# Patient Record
Sex: FEMALE | Race: WHITE | NOT HISPANIC OR LATINO | URBAN - METROPOLITAN AREA
[De-identification: names, ages, dates, MRNs, and addresses within clinical notes are randomized per-mention and may not be internally consistent; named-entity substitution may affect disease eponyms.]

---

## 2017-05-09 ENCOUNTER — INPATIENT (INPATIENT)
Facility: HOSPITAL | Age: 27
LOS: 1 days | Discharge: HOME | DRG: 742 | End: 2017-05-11
Attending: OBSTETRICS & GYNECOLOGY | Admitting: OBSTETRICS & GYNECOLOGY
Payer: COMMERCIAL

## 2017-05-09 VITALS
DIASTOLIC BLOOD PRESSURE: 83 MMHG | TEMPERATURE: 98 F | OXYGEN SATURATION: 98 % | HEIGHT: 67 IN | RESPIRATION RATE: 18 BRPM | SYSTOLIC BLOOD PRESSURE: 116 MMHG | WEIGHT: 130.07 LBS | HEART RATE: 101 BPM

## 2017-05-09 DIAGNOSIS — Z98.89 OTHER SPECIFIED POSTPROCEDURAL STATES: Chronic | ICD-10-CM

## 2017-05-09 LAB
ALBUMIN SERPL ELPH-MCNC: 4.6 G/DL — SIGNIFICANT CHANGE UP (ref 3.4–5)
ALP SERPL-CCNC: 74 U/L — SIGNIFICANT CHANGE UP (ref 40–120)
ALT FLD-CCNC: 18 U/L — SIGNIFICANT CHANGE UP (ref 12–42)
ANION GAP SERPL CALC-SCNC: 5 MMOL/L — LOW (ref 9–16)
APPEARANCE UR: CLEAR — SIGNIFICANT CHANGE UP
AST SERPL-CCNC: 18 U/L — SIGNIFICANT CHANGE UP (ref 15–37)
BASOPHILS NFR BLD AUTO: 0.4 % — SIGNIFICANT CHANGE UP (ref 0–2)
BILIRUB SERPL-MCNC: 0.5 MG/DL — SIGNIFICANT CHANGE UP (ref 0.2–1.2)
BILIRUB UR-MCNC: NEGATIVE — SIGNIFICANT CHANGE UP
BUN SERPL-MCNC: 8 MG/DL — SIGNIFICANT CHANGE UP (ref 7–23)
CALCIUM SERPL-MCNC: 8.8 MG/DL — SIGNIFICANT CHANGE UP (ref 8.5–10.5)
CHLORIDE SERPL-SCNC: 104 MMOL/L — SIGNIFICANT CHANGE UP (ref 96–108)
CO2 SERPL-SCNC: 27 MMOL/L — SIGNIFICANT CHANGE UP (ref 22–31)
COLOR SPEC: YELLOW — SIGNIFICANT CHANGE UP
CREAT SERPL-MCNC: 0.76 MG/DL — SIGNIFICANT CHANGE UP (ref 0.5–1.3)
DIFF PNL FLD: NEGATIVE — SIGNIFICANT CHANGE UP
EOSINOPHIL NFR BLD AUTO: 8.3 % — HIGH (ref 0–6)
GLUCOSE SERPL-MCNC: 91 MG/DL — SIGNIFICANT CHANGE UP (ref 70–99)
GLUCOSE UR QL: NEGATIVE — SIGNIFICANT CHANGE UP
HCG SERPL-ACNC: <1 MIU/ML — SIGNIFICANT CHANGE UP
HCG UR QL: NEGATIVE — SIGNIFICANT CHANGE UP
HCT VFR BLD CALC: 41 % — SIGNIFICANT CHANGE UP (ref 34.5–45)
HGB BLD-MCNC: 14.4 G/DL — SIGNIFICANT CHANGE UP (ref 11.5–15.5)
KETONES UR-MCNC: NEGATIVE — SIGNIFICANT CHANGE UP
LEUKOCYTE ESTERASE UR-ACNC: NEGATIVE — SIGNIFICANT CHANGE UP
LYMPHOCYTES # BLD AUTO: 18.6 % — SIGNIFICANT CHANGE UP (ref 13–44)
MCHC RBC-ENTMCNC: 31.9 PG — SIGNIFICANT CHANGE UP (ref 27–34)
MCHC RBC-ENTMCNC: 35.1 G/DL — SIGNIFICANT CHANGE UP (ref 32–36)
MCV RBC AUTO: 90.7 FL — SIGNIFICANT CHANGE UP (ref 80–100)
MONOCYTES NFR BLD AUTO: 5.9 % — SIGNIFICANT CHANGE UP (ref 2–14)
NEUTROPHILS NFR BLD AUTO: 66.8 % — SIGNIFICANT CHANGE UP (ref 43–77)
NITRITE UR-MCNC: NEGATIVE — SIGNIFICANT CHANGE UP
PH UR: 6.5 — SIGNIFICANT CHANGE UP (ref 5–8)
PLATELET # BLD AUTO: 247 K/UL — SIGNIFICANT CHANGE UP (ref 150–400)
POTASSIUM SERPL-MCNC: 3.9 MMOL/L — SIGNIFICANT CHANGE UP (ref 3.5–5.3)
POTASSIUM SERPL-SCNC: 3.9 MMOL/L — SIGNIFICANT CHANGE UP (ref 3.5–5.3)
PROT SERPL-MCNC: 8.2 G/DL — SIGNIFICANT CHANGE UP (ref 6.4–8.2)
PROT UR-MCNC: NEGATIVE MG/DL — SIGNIFICANT CHANGE UP
RBC # BLD: 4.52 M/UL — SIGNIFICANT CHANGE UP (ref 3.8–5.2)
RBC # FLD: 13.3 % — SIGNIFICANT CHANGE UP (ref 10.3–16.9)
SODIUM SERPL-SCNC: 136 MMOL/L — SIGNIFICANT CHANGE UP (ref 135–145)
SP GR SPEC: 1.01 — SIGNIFICANT CHANGE UP (ref 1–1.03)
UROBILINOGEN FLD QL: 0.2 E.U./DL — SIGNIFICANT CHANGE UP
WBC # BLD: 10.3 K/UL — SIGNIFICANT CHANGE UP (ref 3.8–10.5)
WBC # FLD AUTO: 10.3 K/UL — SIGNIFICANT CHANGE UP (ref 3.8–10.5)

## 2017-05-09 PROCEDURE — 74177 CT ABD & PELVIS W/CONTRAST: CPT | Mod: 26

## 2017-05-09 PROCEDURE — 76856 US EXAM PELVIC COMPLETE: CPT | Mod: 26

## 2017-05-09 PROCEDURE — 99285 EMERGENCY DEPT VISIT HI MDM: CPT

## 2017-05-09 PROCEDURE — 76830 TRANSVAGINAL US NON-OB: CPT | Mod: 26

## 2017-05-09 RX ORDER — SODIUM CHLORIDE 9 MG/ML
1000 INJECTION INTRAMUSCULAR; INTRAVENOUS; SUBCUTANEOUS
Qty: 0 | Refills: 0 | Status: DISCONTINUED | OUTPATIENT
Start: 2017-05-09 | End: 2017-05-10

## 2017-05-09 RX ORDER — MORPHINE SULFATE 50 MG/1
4 CAPSULE, EXTENDED RELEASE ORAL ONCE
Qty: 0 | Refills: 0 | Status: DISCONTINUED | OUTPATIENT
Start: 2017-05-09 | End: 2017-05-09

## 2017-05-09 RX ORDER — HYDROMORPHONE HYDROCHLORIDE 2 MG/ML
0.5 INJECTION INTRAMUSCULAR; INTRAVENOUS; SUBCUTANEOUS
Qty: 0 | Refills: 0 | Status: DISCONTINUED | OUTPATIENT
Start: 2017-05-09 | End: 2017-05-10

## 2017-05-09 RX ORDER — ACETAMINOPHEN 500 MG
975 TABLET ORAL EVERY 8 HOURS
Qty: 0 | Refills: 0 | Status: COMPLETED | OUTPATIENT
Start: 2017-05-09 | End: 2017-05-09

## 2017-05-09 RX ORDER — IOHEXOL 300 MG/ML
50 INJECTION, SOLUTION INTRAVENOUS ONCE
Qty: 0 | Refills: 0 | Status: COMPLETED | OUTPATIENT
Start: 2017-05-09 | End: 2017-05-09

## 2017-05-09 RX ORDER — DRONABINOL 2.5 MG
0 CAPSULE ORAL
Qty: 0 | Refills: 0 | COMMUNITY

## 2017-05-09 RX ORDER — HYDROMORPHONE HYDROCHLORIDE 2 MG/ML
2 INJECTION INTRAMUSCULAR; INTRAVENOUS; SUBCUTANEOUS EVERY 4 HOURS
Qty: 0 | Refills: 0 | Status: DISCONTINUED | OUTPATIENT
Start: 2017-05-09 | End: 2017-05-10

## 2017-05-09 RX ORDER — SODIUM CHLORIDE 9 MG/ML
1000 INJECTION INTRAMUSCULAR; INTRAVENOUS; SUBCUTANEOUS ONCE
Qty: 0 | Refills: 0 | Status: COMPLETED | OUTPATIENT
Start: 2017-05-09 | End: 2017-05-09

## 2017-05-09 RX ADMIN — SODIUM CHLORIDE 125 MILLILITER(S): 9 INJECTION INTRAMUSCULAR; INTRAVENOUS; SUBCUTANEOUS at 15:26

## 2017-05-09 RX ADMIN — MORPHINE SULFATE 4 MILLIGRAM(S): 50 CAPSULE, EXTENDED RELEASE ORAL at 13:35

## 2017-05-09 RX ADMIN — SODIUM CHLORIDE 1000 MILLILITER(S): 9 INJECTION INTRAMUSCULAR; INTRAVENOUS; SUBCUTANEOUS at 13:23

## 2017-05-09 RX ADMIN — MORPHINE SULFATE 4 MILLIGRAM(S): 50 CAPSULE, EXTENDED RELEASE ORAL at 14:00

## 2017-05-09 RX ADMIN — Medication 975 MILLIGRAM(S): at 19:50

## 2017-05-09 RX ADMIN — HYDROMORPHONE HYDROCHLORIDE 0.5 MILLIGRAM(S): 2 INJECTION INTRAMUSCULAR; INTRAVENOUS; SUBCUTANEOUS at 19:01

## 2017-05-09 RX ADMIN — HYDROMORPHONE HYDROCHLORIDE 2 MILLIGRAM(S): 2 INJECTION INTRAMUSCULAR; INTRAVENOUS; SUBCUTANEOUS at 22:43

## 2017-05-09 RX ADMIN — HYDROMORPHONE HYDROCHLORIDE 0.5 MILLIGRAM(S): 2 INJECTION INTRAMUSCULAR; INTRAVENOUS; SUBCUTANEOUS at 19:16

## 2017-05-09 RX ADMIN — IOHEXOL 50 MILLILITER(S): 300 INJECTION, SOLUTION INTRAVENOUS at 15:22

## 2017-05-09 RX ADMIN — Medication 25 MILLIGRAM(S): at 21:43

## 2017-05-09 RX ADMIN — Medication 975 MILLIGRAM(S): at 19:01

## 2017-05-09 RX ADMIN — HYDROMORPHONE HYDROCHLORIDE 2 MILLIGRAM(S): 2 INJECTION INTRAMUSCULAR; INTRAVENOUS; SUBCUTANEOUS at 21:43

## 2017-05-09 NOTE — ED PROVIDER NOTE - MEDICAL DECISION MAKING DETAILS
25 yo female with  hx of endometriosis- LLQ pain x 4 days  mild tenderness HCG neg  seen by GYN ? ruptured cyst  lower susp for torsion- await imaging will admit  requestuing CT as well

## 2017-05-09 NOTE — ED PROVIDER NOTE - OBJECTIVE STATEMENT
25 yo female with hx of endometriosis- with multiple prior surgeries with LLQ pain x 4 days  no fevers or chills no diarrhea  pos flatus  and BM  no recent sexual IC - LMP 4-19-17  no prior hx of torsion ? ruptured cyst in past - no dysuria or flank pain or chills

## 2017-05-09 NOTE — CONSULT NOTE ADULT - SUBJECTIVE AND OBJECTIVE BOX
Pain Management Consult Note - [facility] [phone]    Chief Complaint:  abdominal pain    HPI:25yo LMP 4/20/17 presents to the ED with worsening lower abdominal, suprapubic pain for the past 1 week. Pt is s/p endometriosis resection with Dr. Fraire on 8/5/2016. Pt states she had endometriosis resection in pelvic sidewall region, sigmoid region with Dr. Estes, general surgery, and ureterolysis. Pt states prior to the surgery she was bedbound due to endometriosis pain. Pt states post surgery she felt "better than before" in the first 5 months up until february of this year. She states she has been having intermittent lower abdominal pain that fluctuated from cramping to pulling to sharp pain. She states the pain is worse with movement and better when lying still.  Pt. states she has been on a butrans patch, tramadol and gabapentin in the past.  States she most recently has been using medical marijuana that she gets from an outside source as she has had constipation in the past from opiates.  States she has also tried hypogastric nerve blocks in the past.      Pain is _x__ sharp ____dull ___burning ___achy ___ Intensity: ____ mild ___mod ___severe     Location ____surgical site ____cervical _____lumbar __x__abd ____upper ext____lower ext    Worse with ___x_activity __x__movement _____physical therapy___ Rest    Improved with __x__medication __x__rest ____physical therapy    ROS: Const:  ___febrile   Eyes:___ENT:___CV: ___chest pain  Resp: ____sob  GI:___nausea ___vomiting _+__abd pain ___npo ___clears __full diet __bm  :___ Musk: ___pain ___spasm  Skin:___ Neuro:  _--__sedation__-_confusion___ numbness ___weakness ___paresth  Psych:__anxiety  Endo:___ Heme:___Allergy:_NKDA________, ___all others reviewed and negative    PAST MEDICAL & SURGICAL HISTORY:  Hypovitaminosis D  Endometriosis  Status post surgery: endometriosis surgery: 10/2014, 05/2015, 10/2015   appendectomy during the first surgery 10/2014  S/P bunionectomy: right 2012      SH: denies___Tobacco   ___Alcohol                          FH:FAMILY HISTORY:  No pertinent family history in first degree relatives      sodium chloride 0.9%. 1000milliLiter(s) IV Continuous <Continuous>      T(C): 36.9, Max: 36.9 (05-09 @ 12:55)  HR: 101 (101 - 101)  BP: 116/83 (116/83 - 116/83)  RR: 18 (18 - 18)  SpO2: 98% (98% - 98%)  Wt(kg): --    T(C): 36.9, Max: 36.9 (05-09 @ 12:55)  HR: 101 (101 - 101)  BP: 116/83 (116/83 - 116/83)  RR: 18 (18 - 18)  SpO2: 98% (98% - 98%)  Wt(kg): --    T(C): 36.9, Max: 36.9 (05-09 @ 12:55)  HR: 101 (101 - 101)  BP: 116/83 (116/83 - 116/83)  RR: 18 (18 - 18)  SpO2: 98% (98% - 98%)  Wt(kg): --    PHYSICAL EXAM:  Gen Appearance: _x__no acute distress __x_appropriate        Neuro: ___SILT feet__x__ EOM Intact Psych: AAOX_3_, ___mood/affect appropriate        Eyes: _x__conjunctiva WNL  ___x__ Pupils equal and round        ENT: _x__ears and nose atraumatic_x__ Hearing grossly intact        Neck: ___trachea midline, no visible masses ___thyroid without palpable mass    Resp: x___Nml WOB____No tactile fremitus ___clear to auscultation    Cardio: ___extremities free from edema ____pedal pulses palpable    GI/Abdomen: ___soft _____ Nontender______Nondistended_____HSM    Lymphatic: ___no palpable nodes in neck  ___no palpable nodes calves and feet    Skin/Wound: ___Incision, ___Dressing c/d/i,   ____surrounding tissues soft,  ___drain/chest tube present____    Muscular: EHL ___/5  Gastrocnemius___/5    _x__absent clubbing/cyanosis      ASSESSMENT: This is a 26y old Female with a history of   ENDOMETRIOSIS: ENDOMETRIOSIS  No h/o HF  No pertinent family history in first degree relatives  Hypovitaminosis D  Endometriosis  Status post surgery: endometriosis surgery: 10/2014, 05/2015, 10/2015   appendectomy during the first surgery 10/2014  S/P bunionectomy: right 2012  and worsening abdominal pain being admitted for additional work up.      Recommended Treatment PLAN:  1.  Consider valium 5 mg po q8h prn  2.  Consider  dilaudid 2-4 mg po q4h prn  3.  Consider dilaudid 0.5 mg IV q2h prn  4.  Consider lyrica 25 mg po TID  5.  Consider tylenol 975 mg po q8h standing

## 2017-05-09 NOTE — ED PROVIDER NOTE - PROGRESS NOTE DETAILS
GYN req U/S and CT--  will admit pending imaging  vaginal cx sent - pelvic min CMT - slight left adnexal tenderness-- no sig dc per GYN

## 2017-05-09 NOTE — H&P ADULT - HISTORY OF PRESENT ILLNESS
27yo LMP 17 presents to the ED with worsening lower abdominal, suprapubic pain for the past 1 week. Pt is s/p endometriosis resection with Dr. Fraire on 2016. Pt states she had endometriosis resection in pelvic sidewall region, sigmoid region with Dr. Estes, general surgery, and ureterolysis. Pt states prior to the surgery she was bedbound due to endometriosis pain. Pt states post surgery she felt "better than before" in the first 5 months up until february of this year. She states she has been having intermittent lower abdominal pain that fluctuated from cramping to pulling to sharp pain. She states the pain is worse with movement and better when lying still. She states she has been taking marijuana for the pain which she gets from an outside source, not from pain management. Pt states in the past she used to get nerve blocks for endometrial pain. Pt states she has a mirena IUD in and has had it since  .She states since then she has had her menstrual cycle monthly with very minimal bleeding almost spotting lasting 2-3 days.  Pt denies fever, chills, chest pain, SOB, abdominal pain, nausea, vomiting, vaginal bleeding    OB/GYN Hx: G0  PMHx: Endometriosis  SHx: Endometriosis excision , ,    Meds: Liyah  Allergies:     PHYSICAL EXAM:   Vital Signs Last 24 Hrs  T(C): 36.9, Max: 36.9 (05-09 @ 12:55)  T(F): 98.4, Max: 98.4 (05-09 @ 12:55)  HR: 101 (101 - 101)  BP: 116/83 (116/83 - 116/83)  BP(mean): --  RR: 18 (18 - 18)  SpO2: 98% (98% - 98%)    **************************  Constitutional: No acute distress  Respiratory: Clear to ausculation bilaterally  Cardiovascular: regular rate and rhythm  Gastrointestinal: soft, tenderness to deep palpation lower abdominal region, +rebound, no guarding, positive bowel sounds  Pelvic exam: normal external genitalia, cervix closed, milky white vaginal discharge, cervix closed, +CMT, + b/l adnexal tenderness   Extremities: no calf tenderness or swelling    LABS:                        14.4   10.3  )-----------( 247      ( 09 May 2017 13:35 )             41.0         136  |  104  |  8   ----------------------------<  91  3.9   |  27  |  0.76    Ca    8.8      09 May 2017 13:35    TPro  8.2  /  Alb  4.6  /  TBili  0.5  /  DBili  x   /  AST  18  /  ALT  18  /  AlkPhos  74        Urinalysis Basic - ( 09 May 2017 13:35 )    Color: Yellow / Appearance: Clear / S.010 / pH: x  Gluc: x / Ketone: NEGATIVE  / Bili: NEGATIVE / Urobili: 0.2 E.U./dL   Blood: x / Protein: NEGATIVE mg/dL / Nitrite: NEGATIVE   Leuk Esterase: NEGATIVE / RBC: x / WBC x   Sq Epi: x / Non Sq Epi: x / Bacteria: x    HCG Quantitative, Serum: <1 mIU/mL ( @ 13:35)    RADIOLOGY & ADDITIONAL STUDIES: 27yo LMP 17 presents to the ED with worsening lower abdominal, suprapubic pain for the past 1 week Left>Right. Pt is s/p endometriosis resection with Dr. Fraire on 2016. Pt states she had endometriosis resection in pelvic sidewall region, sigmoid region with Dr. Estes, general surgery, and ureterolysis. Pt states prior to the surgery she was bedbound due to endometriosis pain. Pt states post surgery she felt "better than before" in the first 5 months up until february of this year. She states she has been having intermittent lower abdominal pain that fluctuated from cramping to pulling to sharp pain. She states the pain is worse with movement and better when lying still. She states she has been taking marijuana for the pain which she gets from an outside source, not from pain management. Pt states in the past she used to get nerve blocks for endometrial pain. Pt states she has a mirena IUD in and has had it since  .She states since then she has had her menstrual cycle monthly with very minimal bleeding almost spotting lasting 2-3 days. Pt states painful bowel movements and last bowel movement yesterday- loose stools. Pt states associated nausea.    Pt denies fever, chills, chest pain, SOB, vomiting, vaginal bleeding, or lightheadedness.     OB/GYN Hx: Endometriosis, Mirena IUD since    PMHx: Endometriosis  SHx: Laparoscopic endometriosis excision , . Laparoscopic endometriosis excision with appendectomy in [facility] in 2016 with Dr. Fraire   Meds: Pt states she take Marijuana for pain  NKDA     PHYSICAL EXAM:   Vital Signs Last 24 Hrs  T(C): 36.9, Max: 36.9 (05-09 @ 12:55)  T(F): 98.4, Max: 98.4 (05-09 @ 12:55)  HR: 101 (101 - 101)  BP: 116/83 (116/83 - 116/83)  BP(mean): --  RR: 18 (18 - 18)  SpO2: 98% (98% - 98%)    **************************  Constitutional: No acute distress  Respiratory: Clear to ausculation bilaterally  Cardiovascular: regular rate and rhythm  Gastrointestinal: soft, tenderness to deep palpation lower abdominal region, +rebound, no guarding, positive bowel sounds  Pelvic exam: normal external genitalia, cervix closed, milky white vaginal discharge, cervix closed, +CMT, + b/l adnexal tenderness   Extremities: no calf tenderness or swelling    LABS:                        14.4   10.3  )-----------( 247      ( 09 May 2017 13:35 )             41.0         136  |  104  |  8   ----------------------------<  91  3.9   |  27  |  0.76    Ca    8.8      09 May 2017 13:35    TPro  8.2  /  Alb  4.6  /  TBili  0.5  /  DBili  x   /  AST  18  /  ALT  18  /  AlkPhos  74        Urinalysis Basic - ( 09 May 2017 13:35 )    Color: Yellow / Appearance: Clear / S.010 / pH: x  Gluc: x / Ketone: NEGATIVE  / Bili: NEGATIVE / Urobili: 0.2 E.U./dL   Blood: x / Protein: NEGATIVE mg/dL / Nitrite: NEGATIVE   Leuk Esterase: NEGATIVE / RBC: x / WBC x   Sq Epi: x / Non Sq Epi: x / Bacteria: x    HCG Quantitative, Serum: <1 mIU/mL ( @ 13:35)    RADIOLOGY & ADDITIONAL STUDIES:    A/P: 27yo LMP 17 presents to the ED with worsening lower abdominal, suprapubic pain for the past 1 week left>right. Pt's heart rate is 100. Hemoglobin stable 14.4, WBC 10.3. Follow up transvaginal ultrasound, CT of abdomen and pelvis with contrast, and follow up pain management consult.

## 2017-05-09 NOTE — ED PROVIDER NOTE - CARE PLAN
Principal Discharge DX:	Endometriosis Principal Discharge DX:	Endometriosis  Secondary Diagnosis:	Abdominal pain

## 2017-05-10 ENCOUNTER — RESULT REVIEW (OUTPATIENT)
Age: 27
End: 2017-05-10

## 2017-05-10 DIAGNOSIS — N80.9 ENDOMETRIOSIS, UNSPECIFIED: ICD-10-CM

## 2017-05-10 LAB
APTT BLD: 32.2 SEC — SIGNIFICANT CHANGE UP (ref 27.5–37.4)
BLD GP AB SCN SERPL QL: NEGATIVE — SIGNIFICANT CHANGE UP
CULTURE RESULTS: NO GROWTH — SIGNIFICANT CHANGE UP
INR BLD: 1.13 — SIGNIFICANT CHANGE UP (ref 0.88–1.16)
PROTHROM AB SERPL-ACNC: 12.6 SEC — SIGNIFICANT CHANGE UP (ref 9.8–12.7)
RH IG SCN BLD-IMP: NEGATIVE — SIGNIFICANT CHANGE UP
SPECIMEN SOURCE: SIGNIFICANT CHANGE UP

## 2017-05-10 RX ORDER — METOCLOPRAMIDE HCL 10 MG
10 TABLET ORAL ONCE
Qty: 0 | Refills: 0 | Status: DISCONTINUED | OUTPATIENT
Start: 2017-05-10 | End: 2017-05-11

## 2017-05-10 RX ORDER — ONDANSETRON 8 MG/1
8 TABLET, FILM COATED ORAL EVERY 6 HOURS
Qty: 0 | Refills: 0 | Status: DISCONTINUED | OUTPATIENT
Start: 2017-05-10 | End: 2017-05-11

## 2017-05-10 RX ORDER — ONDANSETRON 8 MG/1
4 TABLET, FILM COATED ORAL ONCE
Qty: 0 | Refills: 0 | Status: COMPLETED | OUTPATIENT
Start: 2017-05-10 | End: 2017-05-10

## 2017-05-10 RX ORDER — ACETAMINOPHEN 500 MG
1000 TABLET ORAL EVERY 6 HOURS
Qty: 0 | Refills: 0 | Status: DISCONTINUED | OUTPATIENT
Start: 2017-05-11 | End: 2017-05-11

## 2017-05-10 RX ORDER — METOCLOPRAMIDE HCL 10 MG
10 TABLET ORAL ONCE
Qty: 0 | Refills: 0 | Status: COMPLETED | OUTPATIENT
Start: 2017-05-10 | End: 2017-05-10

## 2017-05-10 RX ORDER — SODIUM CHLORIDE 9 MG/ML
1000 INJECTION, SOLUTION INTRAVENOUS
Qty: 0 | Refills: 0 | Status: DISCONTINUED | OUTPATIENT
Start: 2017-05-10 | End: 2017-05-11

## 2017-05-10 RX ORDER — HYDROMORPHONE HYDROCHLORIDE 2 MG/ML
0.5 INJECTION INTRAMUSCULAR; INTRAVENOUS; SUBCUTANEOUS ONCE
Qty: 0 | Refills: 0 | Status: DISCONTINUED | OUTPATIENT
Start: 2017-05-10 | End: 2017-05-10

## 2017-05-10 RX ORDER — ONDANSETRON 8 MG/1
4 TABLET, FILM COATED ORAL EVERY 6 HOURS
Qty: 0 | Refills: 0 | Status: DISCONTINUED | OUTPATIENT
Start: 2017-05-10 | End: 2017-05-11

## 2017-05-10 RX ORDER — KETOROLAC TROMETHAMINE 30 MG/ML
30 SYRINGE (ML) INJECTION EVERY 6 HOURS
Qty: 0 | Refills: 0 | Status: DISCONTINUED | OUTPATIENT
Start: 2017-05-11 | End: 2017-05-11

## 2017-05-10 RX ORDER — SIMETHICONE 80 MG/1
80 TABLET, CHEWABLE ORAL EVERY 6 HOURS
Qty: 0 | Refills: 0 | Status: DISCONTINUED | OUTPATIENT
Start: 2017-05-10 | End: 2017-05-11

## 2017-05-10 RX ORDER — HYDROMORPHONE HYDROCHLORIDE 2 MG/ML
30 INJECTION INTRAMUSCULAR; INTRAVENOUS; SUBCUTANEOUS
Qty: 0 | Refills: 0 | Status: DISCONTINUED | OUTPATIENT
Start: 2017-05-10 | End: 2017-05-11

## 2017-05-10 RX ORDER — NALOXONE HYDROCHLORIDE 4 MG/.1ML
0.1 SPRAY NASAL
Qty: 0 | Refills: 0 | Status: DISCONTINUED | OUTPATIENT
Start: 2017-05-10 | End: 2017-05-11

## 2017-05-10 RX ADMIN — Medication 25 MILLIGRAM(S): at 05:32

## 2017-05-10 RX ADMIN — HYDROMORPHONE HYDROCHLORIDE 0.5 MILLIGRAM(S): 2 INJECTION INTRAMUSCULAR; INTRAVENOUS; SUBCUTANEOUS at 09:09

## 2017-05-10 RX ADMIN — HYDROMORPHONE HYDROCHLORIDE 2 MILLIGRAM(S): 2 INJECTION INTRAMUSCULAR; INTRAVENOUS; SUBCUTANEOUS at 06:33

## 2017-05-10 RX ADMIN — Medication 10 MILLIGRAM(S): at 14:17

## 2017-05-10 RX ADMIN — HYDROMORPHONE HYDROCHLORIDE 0.5 MILLIGRAM(S): 2 INJECTION INTRAMUSCULAR; INTRAVENOUS; SUBCUTANEOUS at 14:22

## 2017-05-10 RX ADMIN — HYDROMORPHONE HYDROCHLORIDE 2 MILLIGRAM(S): 2 INJECTION INTRAMUSCULAR; INTRAVENOUS; SUBCUTANEOUS at 11:26

## 2017-05-10 RX ADMIN — HYDROMORPHONE HYDROCHLORIDE 0.5 MILLIGRAM(S): 2 INJECTION INTRAMUSCULAR; INTRAVENOUS; SUBCUTANEOUS at 14:36

## 2017-05-10 RX ADMIN — HYDROMORPHONE HYDROCHLORIDE 0.5 MILLIGRAM(S): 2 INJECTION INTRAMUSCULAR; INTRAVENOUS; SUBCUTANEOUS at 20:38

## 2017-05-10 RX ADMIN — HYDROMORPHONE HYDROCHLORIDE 30 MILLILITER(S): 2 INJECTION INTRAMUSCULAR; INTRAVENOUS; SUBCUTANEOUS at 20:35

## 2017-05-10 RX ADMIN — HYDROMORPHONE HYDROCHLORIDE 0.5 MILLIGRAM(S): 2 INJECTION INTRAMUSCULAR; INTRAVENOUS; SUBCUTANEOUS at 12:21

## 2017-05-10 RX ADMIN — SIMETHICONE 80 MILLIGRAM(S): 80 TABLET, CHEWABLE ORAL at 22:00

## 2017-05-10 RX ADMIN — ONDANSETRON 4 MILLIGRAM(S): 8 TABLET, FILM COATED ORAL at 07:26

## 2017-05-10 RX ADMIN — HYDROMORPHONE HYDROCHLORIDE 0.5 MILLIGRAM(S): 2 INJECTION INTRAMUSCULAR; INTRAVENOUS; SUBCUTANEOUS at 19:55

## 2017-05-10 RX ADMIN — HYDROMORPHONE HYDROCHLORIDE 0.5 MILLIGRAM(S): 2 INJECTION INTRAMUSCULAR; INTRAVENOUS; SUBCUTANEOUS at 08:54

## 2017-05-10 RX ADMIN — HYDROMORPHONE HYDROCHLORIDE 2 MILLIGRAM(S): 2 INJECTION INTRAMUSCULAR; INTRAVENOUS; SUBCUTANEOUS at 10:26

## 2017-05-10 RX ADMIN — ONDANSETRON 4 MILLIGRAM(S): 8 TABLET, FILM COATED ORAL at 15:43

## 2017-05-10 RX ADMIN — HYDROMORPHONE HYDROCHLORIDE 2 MILLIGRAM(S): 2 INJECTION INTRAMUSCULAR; INTRAVENOUS; SUBCUTANEOUS at 05:32

## 2017-05-10 NOTE — PROGRESS NOTE ADULT - SUBJECTIVE AND OBJECTIVE BOX
Pt evaluated at bedside. She reports pain improved overnight w/ regimen as ordered by pain management team.  She denies HA, dizziness, SOB, CP, palpitations, n/v. She is able to ambulate to bathroom and void without any issues.    T(C): 36.7, Max: 36.9 (05-10 @ 05:13)  HR: 75 (64 - 75)  BP: 110/72 (99/53 - 110/72)  RR: 16 (16 - 16)  SpO2: 98% (98% - 99%)  Wt(kg): --  GA: NAD, A+OX3  CV: RRR, no MRG  Pulm: CTAB  Abd: +BS, soft, diffusely tender on palpation w/ mild guarding, no rebound tenderness  Extrem: no calf tenderness  I & Os for 24h ending 05-10 @ 07:00  =============================================  IN: 1375 ml / OUT: 850 ml / NET: 525 ml    I & Os for current day (as of 05-10 @ 07:26)  =============================================  IN: 0 ml / OUT: 200 ml / NET: -200 ml                        14.4   10.3  )-----------( 247      ( 09 May 2017 13:35 )             41.0     05-09    136  |  104  |  8   ----------------------------<  91  3.9   |  27  |  0.76    Ca    8.8      09 May 2017 13:35    TPro  8.2  /  Alb  4.6  /  TBili  0.5  /  DBili  x   /  AST  18  /  ALT  18  /  AlkPhos  74  05-09      MEDICATIONS  (STANDING):  sodium chloride 0.9%. 1000milliLiter(s) IV Continuous <Continuous>  pregabalin 25milliGRAM(s) Oral three times a day  metoclopramide Injectable 10milliGRAM(s) IV Push once    MEDICATIONS  (PRN):  diazepam    Tablet 5milliGRAM(s) Oral every 8 hours PRN anxiety  HYDROmorphone   Tablet 2milliGRAM(s) Oral every 4 hours PRN Moderate Pain (4 - 6)  HYDROmorphone  Injectable 0.5milliGRAM(s) IV Push every 2 hours PRN Severe Pain (7 - 10)

## 2017-05-10 NOTE — PROGRESS NOTE ADULT - SUBJECTIVE AND OBJECTIVE BOX
1100  Pain Management Progress Note - [facility] [phone]    HPI:  Pt. complains of abdominal pain and pelvic pain that is improved with current pain regimen.  States the pain is over the bladder and pubis.  Denies side effects from pain medications.            Pertinent PMH: Pain at: ___Back ___Neck___Knee ___Hip ___Shoulder ___ Opioid tolerance    Pain is _x__ sharp ____dull ___burning ___achy ___ Intensity: ____ mild ____mod ____severe     Location _____surgical site _____cervical _____lumbar __x__abd _____upper ext____lower ext    Worse with __x__activity ___x_movement _____physical therapy___ Rest    Improved with _x___medication ___x_rest ____physical therapy    sodium chloride 0.9%.  HYDROmorphone   Tablet  HYDROmorphone  Injectable  metoclopramide Injectable      ROS: Const:  ___febrile   Eyes:___ENT:___CV: ___chest pain  Resp: ____sob  GI:_-__nausea __-_vomiting __+__abd pain _+__npo ___clears ___full diet __bm  :___ Musk: ___pain ___spasm  Skin:___ Neuro:  _-__xvgiccmk__-_cayfjkhxa____ numbness ___weakness ___paresthesia  Psych:___anxiety  Endo:___ Heme:___Allergy:___      05-09 @ 13:49284 mL/min/1.73M2          Hemoglobin: 14.4 g/dL (05-09 @ 13:35)        T(C): 36.7, Max: 36.9 (05-09 @ 18:40)  HR: 88 (64 - 88)  BP: 102/59 (99/53 - 112/62)  RR: 16 (16 - 18)  SpO2: 97% (97% - 100%)  Wt(kg): --     PHYSICAL EXAM:  Gen Appearance: _x__no acute distress _x__appropriate         Neuro: _x__SILT feet__x__ EOM Intact Psych: AAOX_3_, _x__mood/affect appropriate        Eyes: _x__conjunctiva WNL  ___x__ Pupils equal and round        ENT: _x__ears and nose atraumatic__x_ Hearing grossly intact        Neck: ___trachea midline, no visible masses ___thyroid without palpable mass    Resp: __x_Nml WOB____No tactile fremitus ___clear to auscultation    Cardio: ___extremities free from edema ____pedal pulses palpable    GI/Abdomen: _x__soft __x___ Nontender to very light palpation____Nondistended_____HSM      Lymphatic: ___no palpable nodes in neck  ___no palpable nodes calves and feet    Skin/Wound: _x__Incision (4 1 cm healed incisions on abdomen that are nontender to the touch), ___Dressing c/d/i,   ____surrounding tissues soft,  ___drain/chest tube present____    Muscular: EHL _5__/5  Gastrocnemius___/5    _x__absent clubbing/cyanosis         ASSESSMENT:  This is a 26y old Female with a history of:  ENDOMETRIOSIS  No h/o HF  No pertinent family history in first degree relatives  Hypovitaminosis D  Endometriosis  Status post surgery  S/P bunionectomy  and abdominal pain that is improved today.  Awaiting results of CT scan.        Recommended Treatment PLAN:  1.  Dilaudid 2-4 mg po q4h prn  2.  Dilaudid 0.5 mg IV q2h prn  3.  Valium 5 mg po q8h prn  4.  Lyrica 25 mg po TID  5.  Tylenol 975 mg po q8h

## 2017-05-10 NOTE — BRIEF OPERATIVE NOTE - OPERATION/FINDINGS
Adhesions in pelvis mostly in cul de sac. Thick adhesions on rectum to posterior uterus and on the right side of the pelvis - taken down using sharp dissection. Visible endometriosis lesions resected bilaterally. 6cm right ovarian simple cyst drained. Normal bilateral tubes. Normal left ovary. Normal appearing uterus. Endometriosis lesion visualized on the right diaphragm. Hysteroscopy revealed IUD in place and an arcuate uterus.

## 2017-05-10 NOTE — PROGRESS NOTE ADULT - PROBLEM SELECTOR PLAN 1
- pt added on for laparoscopic excision of endometriosis today  - NPO  - plan for routine postoperative care

## 2017-05-11 VITALS
HEART RATE: 78 BPM | RESPIRATION RATE: 14 BRPM | DIASTOLIC BLOOD PRESSURE: 71 MMHG | TEMPERATURE: 97 F | OXYGEN SATURATION: 95 % | SYSTOLIC BLOOD PRESSURE: 106 MMHG

## 2017-05-11 LAB
ANION GAP SERPL CALC-SCNC: 10 MMOL/L — SIGNIFICANT CHANGE UP (ref 9–16)
BUN SERPL-MCNC: 9 MG/DL — SIGNIFICANT CHANGE UP (ref 7–23)
CALCIUM SERPL-MCNC: 8.8 MG/DL — SIGNIFICANT CHANGE UP (ref 8.5–10.5)
CHLORIDE SERPL-SCNC: 105 MMOL/L — SIGNIFICANT CHANGE UP (ref 96–108)
CO2 SERPL-SCNC: 23 MMOL/L — SIGNIFICANT CHANGE UP (ref 22–31)
CREAT SERPL-MCNC: 0.64 MG/DL — SIGNIFICANT CHANGE UP (ref 0.5–1.3)
CULTURE RESULTS: SIGNIFICANT CHANGE UP
GLUCOSE SERPL-MCNC: 240 MG/DL — HIGH (ref 70–99)
HCT VFR BLD CALC: 37.3 % — SIGNIFICANT CHANGE UP (ref 34.5–45)
HGB BLD-MCNC: 13.3 G/DL — SIGNIFICANT CHANGE UP (ref 11.5–15.5)
MCHC RBC-ENTMCNC: 31.5 PG — SIGNIFICANT CHANGE UP (ref 27–34)
MCHC RBC-ENTMCNC: 35.7 G/DL — SIGNIFICANT CHANGE UP (ref 32–36)
MCV RBC AUTO: 88.4 FL — SIGNIFICANT CHANGE UP (ref 80–100)
PLATELET # BLD AUTO: 238 K/UL — SIGNIFICANT CHANGE UP (ref 150–400)
POTASSIUM SERPL-MCNC: 4.2 MMOL/L — SIGNIFICANT CHANGE UP (ref 3.5–5.3)
POTASSIUM SERPL-SCNC: 4.2 MMOL/L — SIGNIFICANT CHANGE UP (ref 3.5–5.3)
RBC # BLD: 4.22 M/UL — SIGNIFICANT CHANGE UP (ref 3.8–5.2)
RBC # FLD: 12.9 % — SIGNIFICANT CHANGE UP (ref 10.3–16.9)
SODIUM SERPL-SCNC: 138 MMOL/L — SIGNIFICANT CHANGE UP (ref 135–145)
SPECIMEN SOURCE: SIGNIFICANT CHANGE UP
WBC # BLD: 15.3 K/UL — HIGH (ref 3.8–10.5)
WBC # FLD AUTO: 15.3 K/UL — HIGH (ref 3.8–10.5)

## 2017-05-11 RX ORDER — HYDROMORPHONE HYDROCHLORIDE 2 MG/ML
0.5 INJECTION INTRAMUSCULAR; INTRAVENOUS; SUBCUTANEOUS
Qty: 0 | Refills: 0 | Status: DISCONTINUED | OUTPATIENT
Start: 2017-05-11 | End: 2017-05-11

## 2017-05-11 RX ORDER — IBUPROFEN 200 MG
600 TABLET ORAL EVERY 6 HOURS
Qty: 0 | Refills: 0 | Status: DISCONTINUED | OUTPATIENT
Start: 2017-05-11 | End: 2017-05-11

## 2017-05-11 RX ADMIN — Medication 600 MILLIGRAM(S): at 09:00

## 2017-05-11 RX ADMIN — Medication 600 MILLIGRAM(S): at 10:00

## 2017-05-11 RX ADMIN — Medication 30 MILLIGRAM(S): at 00:39

## 2017-05-11 RX ADMIN — SIMETHICONE 80 MILLIGRAM(S): 80 TABLET, CHEWABLE ORAL at 11:33

## 2017-05-11 RX ADMIN — SIMETHICONE 80 MILLIGRAM(S): 80 TABLET, CHEWABLE ORAL at 05:36

## 2017-05-11 RX ADMIN — Medication 30 MILLIGRAM(S): at 00:09

## 2017-05-11 RX ADMIN — Medication 30 MILLIGRAM(S): at 05:36

## 2017-05-11 NOTE — PROGRESS NOTE ADULT - ASSESSMENT
A/P: POD1 L/S excision of endometriosis and JOE  1. FEN - advance diet to regular  2. PAIN - OPM  3.  - await TOV  4. RESP -  IS  5. VTE prophylaxis - SCDs  6. LABS - am cbc  7. DISPO - likely d/c home today if stable and pain well controlled

## 2017-05-11 NOTE — PROGRESS NOTE ADULT - SUBJECTIVE AND OBJECTIVE BOX
Pt. seen at 0838  Pain Management [facility] [phone]    HPI:  Pt. states her abdomen is "sore" but is improved with pain medication.            Pertinent PMH: Pain at: ___Back ___Neck___Knee ___Hip ___Shoulder ___ Opioid tolerance  Abd    Pain is ___ sharp ____dull ___burning ___achy ___ Intensity: ____ mild ____mod ____severe "sore"    Location _x____surgical site _____cervical _____lumbar __x__abd _____upper ext____lower ext    Worse with ___x_activity __x__movement _____physical therapy___ Rest    Improved with __x__medication __x__rest ____physical therapy      naloxone Injectable  ondansetron Injectable  ondansetron Injectable  metoclopramide Injectable  simethicone  oxyCODONE  5 mG/acetaminophen 325 mG  ibuprofen  Tablet  oxyCODONE  5 mG/acetaminophen 325 mG      ROS: Const:  ___febrile   Eyes:___ENT:___CV: ___chest pain  Resp: ____sob  GI:___nausea ___vomiting __+__abd pain ___npo ___clears _+__full diet __bm  :___ Musk: ___pain ___spasm  Skin:___ Neuro:  _-__blawuymq__-_hjmbqmlys__-__ numbness ___weakness ___paresthesia  Psych:___anxiety  Endo:___ Heme:___Allergy:___          Hemoglobin: 14.4 g/dL (05-09 @ 13:35)        T(C): 36.8, Max: 37.4 (05-11 @ 01:31)  HR: 72 (67 - 99)  BP: 106/66 (91/57 - 115/65)  RR: 16 (16 - 19)  SpO2: 98% (98% - 100%)  Wt(kg): --     PHYSICAL EXAM:  Gen Appearance: x___no acute distress _x__appropriate         Neuro: _x__SILT feet__x__ EOM Intact Psych: AAOX3__, _x__mood/affect appropriate        Eyes: _x__conjunctiva WNL  ___x__ Pupils equal and round        ENT: _x__ears and nose atraumatic__x_ Hearing grossly intact        Neck: ___trachea midline, no visible masses ___thyroid without palpable mass    Resp: _x__Nml WOB____No tactile fremitus ___clear to auscultation    Cardio: ___extremities free from edema ____pedal pulses palpable    GI/Abdomen: ___soft _____ Nontender______Nondistended_____HSM    Lymphatic: ___no palpable nodes in neck  ___no palpable nodes calves and feet    Skin/Wound: ___Incision, _x__Dressing c/d/i (4 abdominal incisions with dressing dry and intact)   ____surrounding tissues soft,  ___drain/chest tube present____    Muscular: EHL __5_/5  Gastrocnemius___/5    _x__absent clubbing/cyanosis         ASSESSMENT:  This is a 26y old Female with a history of:  ENDOMETRIOSIS  Hypovitaminosis D  Pelvic pain in female  Laparoscopic exploration of abdomen  Status post surgery  S/P bunionectomy  Abdominal pain and is s/p laparoscopic exploration of abdomen and excision of endometriosis and is doing well.        Recommended Treatment PLAN:  1.  Dilaudid 0.5 mg IV q3h prn  2.  Percocet 5/325 1-2 tab q4h prn  3.  Motrin 600 mg po q6h prn

## 2017-05-11 NOTE — DISCHARGE NOTE ADULT - HOSPITAL COURSE
Patient admitted for pain control/underwent uncomplicated laparoscopy for endometriosis. All postoperative milestones met. Uneventful.

## 2017-05-11 NOTE — DISCHARGE NOTE ADULT - CARE PROVIDER_API CALL
Efrain Fraire), Obstetrics and Gynecology  [address] [place] [address]  Phone: [phone]  Fax: [phone]

## 2017-05-11 NOTE — DISCHARGE NOTE ADULT - CARE PLAN
Principal Discharge DX:	Endometriosis  Goal:	healthy recovery!  Instructions for follow-up, activity and diet:	no sex 6 weeks. no bath 6 weeks. shower is ok. Return to ER for fever >101, severe abdominal pain, excessive vaginal bleeding.

## 2017-05-11 NOTE — PROGRESS NOTE ADULT - SUBJECTIVE AND OBJECTIVE BOX
GYN PROGRESS NOTE PGY2    Patient evaluated at the bedside. No acute events. She did well overnight.  Denies CP/SOB/dizziness/nausea/vomiting/abdominal pain/calf pain.  Pain well controlled on PCA. No flatus. Tolerating regular diet.    T(C): 36.8, Max: 37.4 (05-11 @ 01:31)  HR: 67 (67 - 99)  BP: 91/57 (91/57 - 115/65)  RR: 16 (16 - 19)  SpO2: 98% (97% - 100%)    GEN: patient appears well  LUNGS: no respiratory distress  ABD: soft, NT, ND, steri strips c/d/i  EXT: no calf tenderness, SCDs        I & Os for current day (as of 05-11 @ 07:24)  =============================================  IN: 250 ml / OUT: 2850 ml / NET: -2600 ml

## 2017-05-11 NOTE — DISCHARGE NOTE ADULT - PLAN OF CARE
healthy recovery! no sex 6 weeks. no bath 6 weeks. shower is ok. Return to ER for fever >101, severe abdominal pain, excessive vaginal bleeding.

## 2017-05-12 PROCEDURE — 76830 TRANSVAGINAL US NON-OB: CPT

## 2017-05-12 PROCEDURE — 88305 TISSUE EXAM BY PATHOLOGIST: CPT

## 2017-05-12 PROCEDURE — 85730 THROMBOPLASTIN TIME PARTIAL: CPT

## 2017-05-12 PROCEDURE — 86850 RBC ANTIBODY SCREEN: CPT

## 2017-05-12 PROCEDURE — 86900 BLOOD TYPING SEROLOGIC ABO: CPT

## 2017-05-12 PROCEDURE — 96374 THER/PROPH/DIAG INJ IV PUSH: CPT | Mod: XU

## 2017-05-12 PROCEDURE — 84702 CHORIONIC GONADOTROPIN TEST: CPT

## 2017-05-12 PROCEDURE — 87086 URINE CULTURE/COLONY COUNT: CPT

## 2017-05-12 PROCEDURE — 85027 COMPLETE CBC AUTOMATED: CPT

## 2017-05-12 PROCEDURE — 85025 COMPLETE CBC W/AUTO DIFF WBC: CPT

## 2017-05-12 PROCEDURE — 85610 PROTHROMBIN TIME: CPT

## 2017-05-12 PROCEDURE — 80048 BASIC METABOLIC PNL TOTAL CA: CPT

## 2017-05-12 PROCEDURE — 81003 URINALYSIS AUTO W/O SCOPE: CPT

## 2017-05-12 PROCEDURE — 74177 CT ABD & PELVIS W/CONTRAST: CPT

## 2017-05-12 PROCEDURE — 99285 EMERGENCY DEPT VISIT HI MDM: CPT | Mod: 25

## 2017-05-12 PROCEDURE — 87070 CULTURE OTHR SPECIMN AEROBIC: CPT

## 2017-05-12 PROCEDURE — 80053 COMPREHEN METABOLIC PANEL: CPT

## 2017-05-12 PROCEDURE — 36415 COLL VENOUS BLD VENIPUNCTURE: CPT

## 2017-05-12 PROCEDURE — 86901 BLOOD TYPING SEROLOGIC RH(D): CPT

## 2017-05-12 PROCEDURE — 76856 US EXAM PELVIC COMPLETE: CPT

## 2017-05-12 PROCEDURE — 81025 URINE PREGNANCY TEST: CPT

## 2017-05-15 DIAGNOSIS — Q51.810 ARCUATE UTERUS: ICD-10-CM

## 2017-05-15 DIAGNOSIS — N80.3 ENDOMETRIOSIS OF PELVIC PERITONEUM: ICD-10-CM

## 2017-05-15 DIAGNOSIS — N83.201 UNSPECIFIED OVARIAN CYST, RIGHT SIDE: ICD-10-CM

## 2017-05-15 DIAGNOSIS — N85.8 OTHER SPECIFIED NONINFLAMMATORY DISORDERS OF UTERUS: ICD-10-CM

## 2017-05-15 DIAGNOSIS — N80.1 ENDOMETRIOSIS OF OVARY: ICD-10-CM

## 2017-05-15 DIAGNOSIS — N80.4 ENDOMETRIOSIS OF RECTOVAGINAL SEPTUM AND VAGINA: ICD-10-CM

## 2017-05-15 DIAGNOSIS — N80.5 ENDOMETRIOSIS OF INTESTINE: ICD-10-CM

## 2017-05-15 DIAGNOSIS — N83.8 OTHER NONINFLAMMATORY DISORDERS OF OVARY, FALLOPIAN TUBE AND BROAD LIGAMENT: ICD-10-CM

## 2017-05-15 DIAGNOSIS — N80.9 ENDOMETRIOSIS, UNSPECIFIED: ICD-10-CM

## 2017-05-16 ENCOUNTER — INPATIENT (INPATIENT)
Facility: HOSPITAL | Age: 27
LOS: 1 days | Discharge: HOME | DRG: 761 | End: 2017-05-18
Attending: OBSTETRICS & GYNECOLOGY | Admitting: OBSTETRICS & GYNECOLOGY
Payer: COMMERCIAL

## 2017-05-16 VITALS
OXYGEN SATURATION: 98 % | DIASTOLIC BLOOD PRESSURE: 76 MMHG | RESPIRATION RATE: 22 BRPM | SYSTOLIC BLOOD PRESSURE: 117 MMHG | TEMPERATURE: 98 F | WEIGHT: 134.04 LBS | HEART RATE: 134 BPM

## 2017-05-16 DIAGNOSIS — Z98.89 OTHER SPECIFIED POSTPROCEDURAL STATES: Chronic | ICD-10-CM

## 2017-05-16 LAB — SURGICAL PATHOLOGY STUDY: SIGNIFICANT CHANGE UP

## 2017-05-16 PROCEDURE — 99285 EMERGENCY DEPT VISIT HI MDM: CPT | Mod: 25

## 2017-05-16 RX ORDER — HYDROMORPHONE HYDROCHLORIDE 2 MG/ML
1 INJECTION INTRAMUSCULAR; INTRAVENOUS; SUBCUTANEOUS ONCE
Qty: 0 | Refills: 0 | Status: DISCONTINUED | OUTPATIENT
Start: 2017-05-16 | End: 2017-05-16

## 2017-05-16 RX ORDER — IOHEXOL 300 MG/ML
50 INJECTION, SOLUTION INTRAVENOUS ONCE
Qty: 0 | Refills: 0 | Status: COMPLETED | OUTPATIENT
Start: 2017-05-16 | End: 2017-05-16

## 2017-05-16 RX ORDER — SODIUM CHLORIDE 9 MG/ML
1000 INJECTION INTRAMUSCULAR; INTRAVENOUS; SUBCUTANEOUS ONCE
Qty: 0 | Refills: 0 | Status: COMPLETED | OUTPATIENT
Start: 2017-05-16 | End: 2017-05-16

## 2017-05-16 RX ORDER — ONDANSETRON 8 MG/1
4 TABLET, FILM COATED ORAL ONCE
Qty: 0 | Refills: 0 | Status: COMPLETED | OUTPATIENT
Start: 2017-05-16 | End: 2017-05-16

## 2017-05-16 RX ADMIN — SODIUM CHLORIDE 1000 MILLILITER(S): 9 INJECTION INTRAMUSCULAR; INTRAVENOUS; SUBCUTANEOUS at 23:37

## 2017-05-16 RX ADMIN — ONDANSETRON 4 MILLIGRAM(S): 8 TABLET, FILM COATED ORAL at 23:45

## 2017-05-16 RX ADMIN — IOHEXOL 50 MILLILITER(S): 300 INJECTION, SOLUTION INTRAVENOUS at 23:37

## 2017-05-16 RX ADMIN — HYDROMORPHONE HYDROCHLORIDE 1 MILLIGRAM(S): 2 INJECTION INTRAMUSCULAR; INTRAVENOUS; SUBCUTANEOUS at 23:36

## 2017-05-16 NOTE — ED ADULT TRIAGE NOTE - CHIEF COMPLAINT QUOTE
pt complaining of severe abd pain nausea without vomiting and vaginal bleeding today since 6pm hx of ovarian cyst removal Thursday and endometriosis

## 2017-05-17 DIAGNOSIS — K65.9 PERITONITIS, UNSPECIFIED: ICD-10-CM

## 2017-05-17 DIAGNOSIS — N80.9 ENDOMETRIOSIS, UNSPECIFIED: ICD-10-CM

## 2017-05-17 LAB
ALBUMIN SERPL ELPH-MCNC: 4.6 G/DL — SIGNIFICANT CHANGE UP (ref 3.4–5)
ALP SERPL-CCNC: 74 U/L — SIGNIFICANT CHANGE UP (ref 40–120)
ALT FLD-CCNC: 25 U/L — SIGNIFICANT CHANGE UP (ref 12–42)
ANION GAP SERPL CALC-SCNC: 11 MMOL/L — SIGNIFICANT CHANGE UP (ref 9–16)
APPEARANCE UR: CLEAR — SIGNIFICANT CHANGE UP
APTT BLD: 28.9 SEC — SIGNIFICANT CHANGE UP (ref 27.5–37.4)
AST SERPL-CCNC: 13 U/L — LOW (ref 15–37)
BACTERIA # UR AUTO: PRESENT /HPF
BASOPHILS NFR BLD AUTO: 0.9 % — SIGNIFICANT CHANGE UP (ref 0–2)
BILIRUB SERPL-MCNC: 0.3 MG/DL — SIGNIFICANT CHANGE UP (ref 0.2–1.2)
BILIRUB UR-MCNC: NEGATIVE — SIGNIFICANT CHANGE UP
BLD GP AB SCN SERPL QL: NEGATIVE — SIGNIFICANT CHANGE UP
BUN SERPL-MCNC: 9 MG/DL — SIGNIFICANT CHANGE UP (ref 7–23)
CALCIUM SERPL-MCNC: 9 MG/DL — SIGNIFICANT CHANGE UP (ref 8.5–10.5)
CHLORIDE SERPL-SCNC: 102 MMOL/L — SIGNIFICANT CHANGE UP (ref 96–108)
CO2 SERPL-SCNC: 25 MMOL/L — SIGNIFICANT CHANGE UP (ref 22–31)
COLOR SPEC: YELLOW — SIGNIFICANT CHANGE UP
CREAT SERPL-MCNC: 0.82 MG/DL — SIGNIFICANT CHANGE UP (ref 0.5–1.3)
DIFF PNL FLD: (no result)
EOSINOPHIL NFR BLD AUTO: 16.3 % — HIGH (ref 0–6)
EPI CELLS # UR: (no result) /HPF
GLUCOSE SERPL-MCNC: 127 MG/DL — HIGH (ref 70–99)
GLUCOSE UR QL: NEGATIVE — SIGNIFICANT CHANGE UP
HCG SERPL-ACNC: <1 MIU/ML — SIGNIFICANT CHANGE UP
HCT VFR BLD CALC: 32.2 % — LOW (ref 34.5–45)
HCT VFR BLD CALC: 38.8 % — SIGNIFICANT CHANGE UP (ref 34.5–45)
HGB BLD-MCNC: 11 G/DL — LOW (ref 11.5–15.5)
HGB BLD-MCNC: 14 G/DL — SIGNIFICANT CHANGE UP (ref 11.5–15.5)
INR BLD: 1.02 — SIGNIFICANT CHANGE UP (ref 0.88–1.16)
KETONES UR-MCNC: NEGATIVE — SIGNIFICANT CHANGE UP
LEUKOCYTE ESTERASE UR-ACNC: NEGATIVE — SIGNIFICANT CHANGE UP
LYMPHOCYTES # BLD AUTO: 33.3 % — SIGNIFICANT CHANGE UP (ref 13–44)
MCHC RBC-ENTMCNC: 30.6 PG — SIGNIFICANT CHANGE UP (ref 27–34)
MCHC RBC-ENTMCNC: 31.7 PG — SIGNIFICANT CHANGE UP (ref 27–34)
MCHC RBC-ENTMCNC: 34.2 G/DL — SIGNIFICANT CHANGE UP (ref 32–36)
MCHC RBC-ENTMCNC: 36.1 G/DL — HIGH (ref 32–36)
MCV RBC AUTO: 88 FL — SIGNIFICANT CHANGE UP (ref 80–100)
MCV RBC AUTO: 89.4 FL — SIGNIFICANT CHANGE UP (ref 80–100)
MONOCYTES NFR BLD AUTO: 7.6 % — SIGNIFICANT CHANGE UP (ref 2–14)
NEUTROPHILS NFR BLD AUTO: 41.9 % — LOW (ref 43–77)
NITRITE UR-MCNC: NEGATIVE — SIGNIFICANT CHANGE UP
PH UR: 8 — SIGNIFICANT CHANGE UP (ref 5–8)
PLATELET # BLD AUTO: 199 K/UL — SIGNIFICANT CHANGE UP (ref 150–400)
PLATELET # BLD AUTO: 327 K/UL — SIGNIFICANT CHANGE UP (ref 150–400)
POTASSIUM SERPL-MCNC: 4 MMOL/L — SIGNIFICANT CHANGE UP (ref 3.5–5.3)
POTASSIUM SERPL-SCNC: 4 MMOL/L — SIGNIFICANT CHANGE UP (ref 3.5–5.3)
PROT SERPL-MCNC: 8.1 G/DL — SIGNIFICANT CHANGE UP (ref 6.4–8.2)
PROT UR-MCNC: NEGATIVE MG/DL — SIGNIFICANT CHANGE UP
PROTHROM AB SERPL-ACNC: 11.3 SEC — SIGNIFICANT CHANGE UP (ref 9.8–12.7)
RBC # BLD: 3.6 M/UL — LOW (ref 3.8–5.2)
RBC # BLD: 4.41 M/UL — SIGNIFICANT CHANGE UP (ref 3.8–5.2)
RBC # FLD: 12.8 % — SIGNIFICANT CHANGE UP (ref 10.3–16.9)
RBC # FLD: 13.1 % — SIGNIFICANT CHANGE UP (ref 10.3–16.9)
RBC CASTS # UR COMP ASSIST: (no result) /HPF
RH IG SCN BLD-IMP: NEGATIVE — SIGNIFICANT CHANGE UP
SODIUM SERPL-SCNC: 138 MMOL/L — SIGNIFICANT CHANGE UP (ref 135–145)
SP GR SPEC: 1.01 — SIGNIFICANT CHANGE UP (ref 1–1.03)
UROBILINOGEN FLD QL: 0.2 E.U./DL — SIGNIFICANT CHANGE UP
WBC # BLD: 10.3 K/UL — SIGNIFICANT CHANGE UP (ref 3.8–10.5)
WBC # BLD: 6.1 K/UL — SIGNIFICANT CHANGE UP (ref 3.8–10.5)
WBC # FLD AUTO: 10.3 K/UL — SIGNIFICANT CHANGE UP (ref 3.8–10.5)
WBC # FLD AUTO: 6.1 K/UL — SIGNIFICANT CHANGE UP (ref 3.8–10.5)
WBC UR QL: < 5 /HPF — SIGNIFICANT CHANGE UP

## 2017-05-17 PROCEDURE — 74020: CPT | Mod: 26

## 2017-05-17 PROCEDURE — 74177 CT ABD & PELVIS W/CONTRAST: CPT | Mod: 26

## 2017-05-17 PROCEDURE — 76830 TRANSVAGINAL US NON-OB: CPT | Mod: 26

## 2017-05-17 RX ORDER — KETOROLAC TROMETHAMINE 30 MG/ML
30 SYRINGE (ML) INJECTION ONCE
Qty: 0 | Refills: 0 | Status: DISCONTINUED | OUTPATIENT
Start: 2017-05-17 | End: 2017-05-17

## 2017-05-17 RX ORDER — KETOROLAC TROMETHAMINE 30 MG/ML
30 SYRINGE (ML) INJECTION EVERY 6 HOURS
Qty: 0 | Refills: 0 | Status: DISCONTINUED | OUTPATIENT
Start: 2017-05-17 | End: 2017-05-18

## 2017-05-17 RX ORDER — SODIUM CHLORIDE 9 MG/ML
1000 INJECTION, SOLUTION INTRAVENOUS
Qty: 0 | Refills: 0 | Status: DISCONTINUED | OUTPATIENT
Start: 2017-05-17 | End: 2017-05-17

## 2017-05-17 RX ORDER — ONDANSETRON 8 MG/1
4 TABLET, FILM COATED ORAL EVERY 4 HOURS
Qty: 0 | Refills: 0 | Status: COMPLETED | OUTPATIENT
Start: 2017-05-17 | End: 2017-05-17

## 2017-05-17 RX ORDER — IPRATROPIUM/ALBUTEROL SULFATE 18-103MCG
3 AEROSOL WITH ADAPTER (GRAM) INHALATION ONCE
Qty: 0 | Refills: 0 | Status: COMPLETED | OUTPATIENT
Start: 2017-05-17 | End: 2017-05-17

## 2017-05-17 RX ORDER — MAGNESIUM HYDROXIDE 400 MG/1
30 TABLET, CHEWABLE ORAL ONCE
Qty: 0 | Refills: 0 | Status: COMPLETED | OUTPATIENT
Start: 2017-05-17 | End: 2017-05-17

## 2017-05-17 RX ORDER — POLYETHYLENE GLYCOL 3350 17 G/17G
17 POWDER, FOR SOLUTION ORAL ONCE
Qty: 0 | Refills: 0 | Status: COMPLETED | OUTPATIENT
Start: 2017-05-17 | End: 2017-05-17

## 2017-05-17 RX ORDER — MAGNESIUM HYDROXIDE 400 MG/1
30 TABLET, CHEWABLE ORAL DAILY
Qty: 0 | Refills: 0 | Status: DISCONTINUED | OUTPATIENT
Start: 2017-05-17 | End: 2017-05-18

## 2017-05-17 RX ORDER — HYDROMORPHONE HYDROCHLORIDE 2 MG/ML
1 INJECTION INTRAMUSCULAR; INTRAVENOUS; SUBCUTANEOUS ONCE
Qty: 0 | Refills: 0 | Status: DISCONTINUED | OUTPATIENT
Start: 2017-05-17 | End: 2017-05-17

## 2017-05-17 RX ORDER — DOCUSATE SODIUM 100 MG
100 CAPSULE ORAL
Qty: 0 | Refills: 0 | Status: DISCONTINUED | OUTPATIENT
Start: 2017-05-17 | End: 2017-05-18

## 2017-05-17 RX ORDER — POLYETHYLENE GLYCOL 3350 17 G/17G
17 POWDER, FOR SOLUTION ORAL
Qty: 0 | Refills: 0 | Status: DISCONTINUED | OUTPATIENT
Start: 2017-05-17 | End: 2017-05-18

## 2017-05-17 RX ORDER — METOCLOPRAMIDE HCL 10 MG
10 TABLET ORAL ONCE
Qty: 0 | Refills: 0 | Status: COMPLETED | OUTPATIENT
Start: 2017-05-17 | End: 2017-05-17

## 2017-05-17 RX ORDER — ONDANSETRON 8 MG/1
4 TABLET, FILM COATED ORAL ONCE
Qty: 0 | Refills: 0 | Status: COMPLETED | OUTPATIENT
Start: 2017-05-17 | End: 2017-05-17

## 2017-05-17 RX ORDER — HYDROMORPHONE HYDROCHLORIDE 2 MG/ML
2 INJECTION INTRAMUSCULAR; INTRAVENOUS; SUBCUTANEOUS
Qty: 0 | Refills: 0 | Status: DISCONTINUED | OUTPATIENT
Start: 2017-05-17 | End: 2017-05-18

## 2017-05-17 RX ORDER — OXYCODONE HYDROCHLORIDE 5 MG/1
5 TABLET ORAL ONCE
Qty: 0 | Refills: 0 | Status: DISCONTINUED | OUTPATIENT
Start: 2017-05-17 | End: 2017-05-17

## 2017-05-17 RX ORDER — HYDROMORPHONE HYDROCHLORIDE 2 MG/ML
1 INJECTION INTRAMUSCULAR; INTRAVENOUS; SUBCUTANEOUS
Qty: 0 | Refills: 0 | Status: DISCONTINUED | OUTPATIENT
Start: 2017-05-17 | End: 2017-05-18

## 2017-05-17 RX ORDER — ACETAMINOPHEN 500 MG
1000 TABLET ORAL ONCE
Qty: 0 | Refills: 0 | Status: COMPLETED | OUTPATIENT
Start: 2017-05-17 | End: 2017-05-17

## 2017-05-17 RX ORDER — METOCLOPRAMIDE HCL 10 MG
10 TABLET ORAL EVERY 4 HOURS
Qty: 0 | Refills: 0 | Status: COMPLETED | OUTPATIENT
Start: 2017-05-17 | End: 2017-05-17

## 2017-05-17 RX ADMIN — HYDROMORPHONE HYDROCHLORIDE 2 MILLIGRAM(S): 2 INJECTION INTRAMUSCULAR; INTRAVENOUS; SUBCUTANEOUS at 22:29

## 2017-05-17 RX ADMIN — Medication 30 MILLIGRAM(S): at 03:46

## 2017-05-17 RX ADMIN — Medication 100 MILLIGRAM(S): at 12:31

## 2017-05-17 RX ADMIN — POLYETHYLENE GLYCOL 3350 17 GRAM(S): 17 POWDER, FOR SOLUTION ORAL at 12:31

## 2017-05-17 RX ADMIN — MAGNESIUM HYDROXIDE 30 MILLILITER(S): 400 TABLET, CHEWABLE ORAL at 15:33

## 2017-05-17 RX ADMIN — MAGNESIUM HYDROXIDE 30 MILLILITER(S): 400 TABLET, CHEWABLE ORAL at 19:30

## 2017-05-17 RX ADMIN — Medication 30 MILLIGRAM(S): at 14:01

## 2017-05-17 RX ADMIN — HYDROMORPHONE HYDROCHLORIDE 1 MILLIGRAM(S): 2 INJECTION INTRAMUSCULAR; INTRAVENOUS; SUBCUTANEOUS at 00:20

## 2017-05-17 RX ADMIN — SODIUM CHLORIDE 125 MILLILITER(S): 9 INJECTION, SOLUTION INTRAVENOUS at 15:40

## 2017-05-17 RX ADMIN — SODIUM CHLORIDE 1000 MILLILITER(S): 9 INJECTION INTRAMUSCULAR; INTRAVENOUS; SUBCUTANEOUS at 00:43

## 2017-05-17 RX ADMIN — HYDROMORPHONE HYDROCHLORIDE 2 MILLIGRAM(S): 2 INJECTION INTRAMUSCULAR; INTRAVENOUS; SUBCUTANEOUS at 09:47

## 2017-05-17 RX ADMIN — ONDANSETRON 4 MILLIGRAM(S): 8 TABLET, FILM COATED ORAL at 03:45

## 2017-05-17 RX ADMIN — HYDROMORPHONE HYDROCHLORIDE 1 MILLIGRAM(S): 2 INJECTION INTRAMUSCULAR; INTRAVENOUS; SUBCUTANEOUS at 03:46

## 2017-05-17 RX ADMIN — SODIUM CHLORIDE 125 MILLILITER(S): 9 INJECTION, SOLUTION INTRAVENOUS at 05:00

## 2017-05-17 RX ADMIN — HYDROMORPHONE HYDROCHLORIDE 2 MILLIGRAM(S): 2 INJECTION INTRAMUSCULAR; INTRAVENOUS; SUBCUTANEOUS at 23:52

## 2017-05-17 RX ADMIN — POLYETHYLENE GLYCOL 3350 17 GRAM(S): 17 POWDER, FOR SOLUTION ORAL at 19:30

## 2017-05-17 RX ADMIN — SODIUM CHLORIDE 125 MILLILITER(S): 9 INJECTION, SOLUTION INTRAVENOUS at 08:51

## 2017-05-17 RX ADMIN — HYDROMORPHONE HYDROCHLORIDE 1 MILLIGRAM(S): 2 INJECTION INTRAMUSCULAR; INTRAVENOUS; SUBCUTANEOUS at 02:49

## 2017-05-17 RX ADMIN — Medication 10 MILLIGRAM(S): at 13:49

## 2017-05-17 RX ADMIN — ONDANSETRON 4 MILLIGRAM(S): 8 TABLET, FILM COATED ORAL at 13:14

## 2017-05-17 RX ADMIN — HYDROMORPHONE HYDROCHLORIDE 1 MILLIGRAM(S): 2 INJECTION INTRAMUSCULAR; INTRAVENOUS; SUBCUTANEOUS at 01:24

## 2017-05-17 RX ADMIN — Medication 30 MILLIGRAM(S): at 13:20

## 2017-05-17 RX ADMIN — HYDROMORPHONE HYDROCHLORIDE 2 MILLIGRAM(S): 2 INJECTION INTRAMUSCULAR; INTRAVENOUS; SUBCUTANEOUS at 08:51

## 2017-05-17 RX ADMIN — Medication 30 MILLIGRAM(S): at 18:59

## 2017-05-17 RX ADMIN — Medication 3 MILLILITER(S): at 05:50

## 2017-05-17 RX ADMIN — Medication 30 MILLIGRAM(S): at 04:51

## 2017-05-17 RX ADMIN — HYDROMORPHONE HYDROCHLORIDE 1 MILLIGRAM(S): 2 INJECTION INTRAMUSCULAR; INTRAVENOUS; SUBCUTANEOUS at 11:42

## 2017-05-17 RX ADMIN — Medication 30 MILLIGRAM(S): at 19:30

## 2017-05-17 RX ADMIN — Medication 400 MILLIGRAM(S): at 05:30

## 2017-05-17 RX ADMIN — HYDROMORPHONE HYDROCHLORIDE 1 MILLIGRAM(S): 2 INJECTION INTRAMUSCULAR; INTRAVENOUS; SUBCUTANEOUS at 01:22

## 2017-05-17 RX ADMIN — Medication 1000 MILLIGRAM(S): at 06:30

## 2017-05-17 RX ADMIN — HYDROMORPHONE HYDROCHLORIDE 1 MILLIGRAM(S): 2 INJECTION INTRAMUSCULAR; INTRAVENOUS; SUBCUTANEOUS at 12:36

## 2017-05-17 NOTE — CHART NOTE - NSCHARTNOTEFT_GEN_A_CORE
Patient seen and examined bedside in the ED, just prior to transfer to [facility].    Found with hips flexed, lying on left side. States that pain is at its best at time of exam. No nausea, vomiting. Pain is worst in suprapubic area and across LLQ.    Abd moderately tender to palpation across BLQ with some involuntary guarding. No rigidity. No rebound.    Will continue to monitor

## 2017-05-17 NOTE — H&P ADULT - PROBLEM SELECTOR PLAN 1
- admit for IV pain management  - TVUS  - clinical picture not consistent w/ torsion, pain likely secondary to endometriosis  - general surgery consulted, reccs appreciated, possible intervention for constipation noted on CT  - IVH, NPO  dw Dr. Fraire

## 2017-05-17 NOTE — H&P ADULT - HISTORY OF PRESENT ILLNESS
25 yo POD6 s/p laparoscopic excision of endometriosis presents c/o severe abdominal pain that started earlier this evening. She describes pain as crampy and sharp, 10/10, unrelieved by percocet, toradol PO and marijuana, associated w/ nausea w/o emesis. She states she had abdominal pain and small vaginal bleeding 2 days prior at the start of her first menses s/p surgery but that pain resolved. She denies HA, dizziness, CP, SOB, palpitations, fever. She states that she starts shaking when she has severe pain and pain is mildly relieved when she bends her knees. She states she has been passing gas and having bowel movements since surgery. She has an extensive history of endometriosis s/p 5x laparoscopic surgeries, 3 of which were ablation and 2 of which were with Dr. Fraire. She states she has been bedbound due to endometriosis pain in the past however states today's pain is different.

## 2017-05-17 NOTE — ED PROVIDER NOTE - OBJECTIVE STATEMENT
severe abd pain  27 yo with gradual onset Intensifying abd pain, last week with surgery for ovarian cyst, no with generalized abd pain worse at LLQ, nausea but no vomiting, constipation but no diarrhea. took percocet , and a CBD pill and smoked marijuana w no relief , pt with vaginal bleeding. pt has endometriosis and has generally extremely painful periods however usually not this intense.

## 2017-05-17 NOTE — PROGRESS NOTE ADULT - ASSESSMENT
26F s/p laparoscopic endometriosis resection  -patient having bowel function and passing flatus and having BMs  -patient can be advanced to clear liquid diet as tolerated  -serial abdominal exams

## 2017-05-17 NOTE — PROGRESS NOTE ADULT - ASSESSMENT
25 y/o POD7 after laparoscopic endometriosis excision re-admitted secondary to severe abdominal pain. CT abdomen 5/16 showed normal postoperative changes without abscess or hematoma. Small amount of free air, likely secondary to laparoscopic surgery, though perforation cannot be excluded given physical exam. CT also concerning for severe amount of stool.   1. Abdominal pain   - low suspicion for perforation given CT, normal Vital signs and no WBC. However, patient continues to have severe pain   - repeat CBC to follow WBC   - Suspect that severe constipation may be directly impacting current presentation.   Plan for milk of magnesium, to be followed by suppository. If no pain relief with passage of stool, perforation more supicious     Neuro: s/p Pain consult this AM. Continue toradol with PO dilaudid   CV: VSS.  LR @125cc/hr   Pulm:No issues   : lochia within normal limits.   DVT: SCDs and early ambulation     Appreciate Gen surg continued recommendations. We will continue to follow closely.

## 2017-05-17 NOTE — H&P ADULT - NSHPLABSRESULTS_GEN_ALL_CORE
14.0   10.3  )-----------( 327      ( 17 May 2017 00:37 )             38.8   05-17    138  |  102  |  9   ----------------------------<  127<H>  4.0   |  25  |  0.82    Ca    9.0      17 May 2017 00:37    TPro  8.1  /  Alb  4.6  /  TBili  0.3  /  DBili  x   /  AST  13<L>  /  ALT  25  /  AlkPhos  74  05-17  PT/INR - ( 17 May 2017 00:39 )   PT: 11.3 sec;   INR: 1.02          PTT - ( 17 May 2017 00:39 )  PTT:28.9 sec    EXAM:  CT ABDOMEN AND PELVIS OC IC                          *** ADDENDUM 05/17/2017  ***    Scattered locules of air adjacent to the anterior wall of the urinary   bladder and within the anterior abdomen, is likely related to the   patient's recentlaparoscopic procedure      *** END OF ADDENDUM 05/17/2017  ***    INTERPRETATION:  CT of the ABDOMEN and PELVIS with intravenous contrast   dated 5/17/2017 1:47 AM    INDICATION: Abdominal pain.    TECHNIQUE: CT of the abdomen and pelvis was performed with oral and   intravenous contrast. Axial, sagittal and coronal images were produced   and reviewed.    PRIOR STUDIES: None.    FINDINGS: Images of the lower chest demonstrate no significant   abnormality. Contrast in the distal esophagus suggestive of   gastroesophageal reflux.    The liver is normal in appearance.  No radiopaque stones are seen in the   gallbladder.  The pancreas is normal in appearance.  No splenic   abnormalities are seen.    The adrenal glands are unremarkable. The kidneys are normal in   appearance.        No abdominal aortic aneurysm is seen. No lymphadenopathy is seen.    Evaluation of the bowel demonstrates moderate stool throughout the colon,   increased in extent since the prior study. No ascites is seen.    Images of the pelvis demonstrate the uterus to be normal in appearance.   An intrauterine device is visualized within the endometrial cavity. The   previously noted 6.3 cm complex cystic lesion is no longer visualized   consistent with cystectomy. Normal appearance to both ovaries.    Scattered locules of intraperitoneal free air within the pelvis. Likely   related to patient's recent procedure.    Evaluation of the osseous structures demonstrates no acute osseous   abnormality      IMPRESSION:  Extensive amount of stool throughout the large bowel consistent with   fecal retention.  Status post right ovarian cystectomy.   Tiny scattered locules of free air adjacent to the anterior wall of the   urinary bladder, likely related to the patient's recent procedure  Trace oral contrast within the distal esophagus suggestive of   gastroesophagealreflux disease.  ***Please see the addendum at the top of this report. It may contain   additional important information or changes.****      "Thank you for the opportunity to participate in the care of this   patient."    MELANY WEINER M.D., RESIDENT RADIOLOGIST  This document has been electronically signed.  FARZAD REBOLLAR M.D., ATTENDING RADIOLOGIST  This document has been electronically signed. May 17 2017  2:07AM  Addend:  FARZAD REBOLLAR M.D., ATTENDING RADIOLOGIST  This addendum was electronicallysigned on: May 17 2017  2:22AM.

## 2017-05-17 NOTE — ED PROVIDER NOTE - MEDICAL DECISION MAKING DETAILS
pt w severe abd pain, vag bleeding, after recent ovarian cyst surgery, intial concern for bowel perf, none identified, U/S ordered to r/o torsion and admitted to gyn,  also consider pain secondary to endometriosis.

## 2017-05-17 NOTE — H&P ADULT - PSH
S/P bunionectomy  right 2012  Status post surgery  endometriosis surgery: 10/2014, 05/2015, 10/2015   appendectomy during the first surgery 10/2014

## 2017-05-17 NOTE — CONSULT NOTE ADULT - ASSESSMENT
25 yo F w/pmhx of endometriosis s/p ablation x3 at [facility] and laparoscopic excision (Dr. Fraire, 8/5/16 and 5/10/17) who presents on POD7 with c/o severe abdominal pain that started earlier this evening.  Due to severity of clinical exam there is a genuine concern for possible bowel perforation 2/2 sharp dissection performed during operation on 5/10/17, however, CT A/P relatively unremarkable.  Also, patient afebrile.  Tachycardic but HR normalizing with resuscitation and pain medication.  No leukocytosis, h/h 14/38.8, and no other lab abnormalities.  CT A/P with small foci of air that can easily be attributed to recent laparoscopic surgery and extensive stool burden.  Abdominal pain could be attributed to recent surgery + severe constipation/gas pain + menstrual pain.      - Agree with admission to Dr. Fraire's team for observation and pain management  - Recommend NPO with frequent serial abdominal exams  - Once abdominal exams show improvement, would suggest aggressive bowel regimen to combat extensive constipation  - Surgery to follow

## 2017-05-17 NOTE — H&P ADULT - ASSESSMENT
25 yo POD6 presents w/ severe abdominal pain, unremarkable CT scan, possible due to severe dysmenorrhea secondary to endometriosis, hemodynamically stable

## 2017-05-17 NOTE — CONSULT NOTE ADULT - SUBJECTIVE AND OBJECTIVE BOX
[NOTE IN PROGRESS]  HPI:  25 yo POD6 s/p laparoscopic excision of endometriosis presents c/o severe abdominal pain that started earlier this evening. She describes pain as crampy and sharp, 10/10, unrelieved by percocet, toradol PO and marijuana, associated w/ nausea w/o emesis. She states she had abdominal pain and small vaginal bleeding 2 days prior at the start of her first menses s/p surgery but that pain resolved. She denies HA, dizziness, CP, SOB, palpitations, fever. She states that she starts shaking when she has severe pain and pain is mildly relieved when she bends her knees. She states she has been passing gas and having bowel movements since surgery. She has an extensive history of endometriosis s/p 5x laparoscopic surgeries, 3 of which were ablation and 2 of which were with Dr. Fraire. She states she has been bedbound due to endometriosis pain in the past however states today's pain is different. (17 May 2017 03:26)      PAST MEDICAL & SURGICAL HISTORY:  Hypovitaminosis D  Endometriosis  Status post surgery: endometriosis surgery: 10/2014, 05/2015, 10/2015   appendectomy during the first surgery 10/2014  S/P bunionectomy: right 2012    MEDICATIONS:  lactated ringers. 1000milliLiter(s) IV Continuous <Continuous>  HYDROmorphone   Tablet 1milliGRAM(s) Oral every 3 hours PRN  HYDROmorphone   Tablet 2milliGRAM(s) Oral every 3 hours PRN  ALBUTerol/ipratropium for Nebulization. 3milliLiter(s) Nebulizer once  ondansetron Injectable 4milliGRAM(s) IV Push once  acetaminophen  IVPB. 1000milliGRAM(s) IV Intermittent once    ALLERGIES:  No Known Allergies    SOCIAL HISTORY: Rare etoh. Smokes spliffs (tobacco/marijuana) for pain control.    FAMILY HISTORY: No pertinent family history in first degree relatives    Vital Signs Last 24 Hrs  T(C): 36.7, Max: 36.8 (05-16 @ 23:11)  T(F): 98.1, Max: 98.2 (05-16 @ 23:11)  HR: 96 (96 - 134)  BP: 100/58 (100/58 - 117/76)  BP(mean): --  RR: 20 (20 - 22)  SpO2: 97% (97% - 98%)    PHYSICAL EXAM:  Gen: In moderate distress 2/2 pain, A&Ox3  HEENT: nc/at, eomi, CN II-XI grossly intact, mmm  CV: Regular rhythm, tachycardic, no M/R/G  Pulm: CTAB, no wheezing/rales/rhonchi, no incr wob  Abdomen: Moderate distension with firmness over lower abd c/f involuntary guarding, ttp over lower abd, +BS  Ext/Pulses: 2+ DP/PT b/l, no c/c/e, wwp    LABS:                        14.0   10.3  )-----------( 327      ( 17 May 2017 00:37 )             38.8     05-17    138  |  102  |  9   ----------------------------<  127<H>  4.0   |  25  |  0.82    Ca    9.0      17 May 2017 00:37    TPro  8.1  /  Alb  4.6  /  TBili  0.3  /  DBili  x   /  AST  13<L>  /  ALT  25  /  AlkPhos  74  05-17    PT/INR - ( 17 May 2017 00:39 )   PT: 11.3 sec;   INR: 1.02        PTT - ( 17 May 2017 00:39 )  PTT:28.9 sec      RADIOLOGY & ADDITIONAL STUDIES:  EXAM:  CT ABDOMEN AND PELVIS OC IC                        PROCEDURE DATE:  05/17/2017    FINDINGS: Images of the lower chest demonstrate no significant   abnormality. Contrast in the distal esophagus suggestive of   gastroesophageal reflux.    The liver is normal in appearance.  No radiopaque stones are seen in the   gallbladder.  The pancreas is normal in appearance.  No splenic   abnormalities are seen.    The adrenal glands are unremarkable. The kidneys are normal in   appearance.        No abdominal aortic aneurysm is seen. No lymphadenopathy is seen.    Evaluation of the bowel demonstrates moderate stool throughout the colon,   increased in extent since the prior study. No ascites is seen.    Images of the pelvis demonstrate the uterus to be normal in appearance.   An intrauterine device is visualized within the endometrial cavity. The   previously noted 6.3 cm complex cystic lesion is no longer visualized   consistent with cystectomy. Normal appearance to both ovaries.    Scattered locules of intraperitoneal free air within the pelvis. Likely   related to patient's recent procedure.    Evaluation of the osseous structures demonstrates no acute osseous   abnormality      IMPRESSION:  Extensive amount of stool throughout the large bowel consistent with   fecal retention.    Status post right ovarian cystectomy.     Tiny scattered locules of free air adjacent to the anterior wall of the   urinary bladder, likely related to the patient's recent procedure    Trace oral contrast within the distal esophagus suggestive of   gastroesophageal reflux disease.    *** ADDENDUM 05/17/2017  ***  Scattered locules of air adjacent to the anterior wall of the urinary   bladder and within the anterior abdomen, is likely related to the   patient's recentlaparoscopic procedure HPI:  25 yo F w/pmhx of endometriosis s/p ablation x3 at [facility] and laparoscopic excision (Dr. Fraire, 8/5/16 and 5/10/17) who presents on POD7 with c/o severe abdominal pain that started earlier this evening. She describes pain as crampy and sharp, 10/10, unrelieved by percocet, PO toradol, and marijuana, which normally work to relieve her pain.  Patient states that she had been doing well post-operatively and had stopped taking her percocet 2 days prior.  However, her period started yesterday with bleeding and pain significant for her.  She normally does not bleed very much 2/2 her Mirena IUD.  Pain is associated w/ nausea w/o emesis and temperature dysregulation.  She states she has been passing gas and having bowel movements since surgery.  Patient denies gu symptoms, diarrhea, constipation, hematochezia, melena, HA, dizziness, CP, SOB, palpitations, fever.  She states she has been bedbound due to endometriosis pain in the past however states today's pain is different.  Patient states that most recent surgery was more emergent than prior surgery, so no pre-op bowel prep was performed.  Brief op report reports " Adhesions in pelvis mostly in cul de sac. Thick adhesions on rectum to posterior uterus and on the right side of the pelvis - taken down using sharp dissection."  Per Gyn service, most of the specimens harvested from last surgery were chronically inflamed tissue and not endometriosis.  Patient's exam is concerning for acute abdomen due to distension and what appears to be involuntary guarding, however, unable to determine whether this is truly involuntary vs voluntary.  Patient is also ill-appearing, pale, shaking 2/2 her pain, and attempting to relieve her abdominal rigidity by bending her knees.  In ED, T 98.2, , /76, RR 22, O2 98%, no leukocytosis, h/h 14/38.8, and no other lab abnormalities.  CT A/P surprisingly unremarkable, only small foci of air that can easily be attributed to recent laparoscopic surgery and extensive stool burden.      PAST MEDICAL & SURGICAL HISTORY:  Hypovitaminosis D  Endometriosis  Status post surgery: endometriosis surgery at [facility] (ablation): 10/2014, 05/2015, 10/2015   appendectomy during the first surgery 10/2014  Laparoscopic excision of endometriosis (Dr. Fraire, 8/5/16 and 5/10/17), General surgery involved in 8/5/16  S/P bunionectomy: right 2012    MEDICATIONS:  lactated ringers. 1000milliLiter(s) IV Continuous <Continuous>  HYDROmorphone   Tablet 1milliGRAM(s) Oral every 3 hours PRN  HYDROmorphone   Tablet 2milliGRAM(s) Oral every 3 hours PRN  ALBUTerol/ipratropium for Nebulization. 3milliLiter(s) Nebulizer once  ondansetron Injectable 4milliGRAM(s) IV Push once  acetaminophen  IVPB. 1000milliGRAM(s) IV Intermittent once    ALLERGIES:  No Known Allergies    SOCIAL HISTORY: Rare etoh. Smokes spliffs (tobacco/marijuana) for pain control.    FAMILY HISTORY: No pertinent family history in first degree relatives    Vital Signs Last 24 Hrs  T(C): 36.7, Max: 36.8 (05-16 @ 23:11)  T(F): 98.1, Max: 98.2 (05-16 @ 23:11)  HR: 96 (96 - 134)  BP: 100/58 (100/58 - 117/76)  BP(mean): --  RR: 20 (20 - 22)  SpO2: 97% (97% - 98%)    PHYSICAL EXAM:  Gen: In moderate distress 2/2 pain, A&Ox3  HEENT: nc/at, eomi, CN II-XI grossly intact, mmm  CV: Regular rhythm, tachycardic, no M/R/G  Pulm: CTAB, no wheezing/rales/rhonchi, no incr wob  Abdomen: Moderate distension with firmness over lower abd c/f involuntary guarding, ttp over lower abd, +BS  Ext/Pulses: 2+ DP/PT b/l, no c/c/e, wwp    LABS:                        14.0   10.3  )-----------( 327      ( 17 May 2017 00:37 )             38.8     05-17    138  |  102  |  9   ----------------------------<  127<H>  4.0   |  25  |  0.82    Ca    9.0      17 May 2017 00:37    TPro  8.1  /  Alb  4.6  /  TBili  0.3  /  DBili  x   /  AST  13<L>  /  ALT  25  /  AlkPhos  74  05-17    PT/INR - ( 17 May 2017 00:39 )   PT: 11.3 sec;   INR: 1.02        PTT - ( 17 May 2017 00:39 )  PTT:28.9 sec      RADIOLOGY & ADDITIONAL STUDIES:  EXAM:  CT ABDOMEN AND PELVIS OC IC                        PROCEDURE DATE:  05/17/2017    FINDINGS: Images of the lower chest demonstrate no significant   abnormality. Contrast in the distal esophagus suggestive of   gastroesophageal reflux.    The liver is normal in appearance.  No radiopaque stones are seen in the   gallbladder.  The pancreas is normal in appearance.  No splenic   abnormalities are seen.    The adrenal glands are unremarkable. The kidneys are normal in   appearance.        No abdominal aortic aneurysm is seen. No lymphadenopathy is seen.    Evaluation of the bowel demonstrates moderate stool throughout the colon,   increased in extent since the prior study. No ascites is seen.    Images of the pelvis demonstrate the uterus to be normal in appearance.   An intrauterine device is visualized within the endometrial cavity. The   previously noted 6.3 cm complex cystic lesion is no longer visualized   consistent with cystectomy. Normal appearance to both ovaries.    Scattered locules of intraperitoneal free air within the pelvis. Likely   related to patient's recent procedure.    Evaluation of the osseous structures demonstrates no acute osseous   abnormality      IMPRESSION:  Extensive amount of stool throughout the large bowel consistent with   fecal retention.    Status post right ovarian cystectomy.     Tiny scattered locules of free air adjacent to the anterior wall of the   urinary bladder, likely related to the patient's recent procedure    Trace oral contrast within the distal esophagus suggestive of   gastroesophageal reflux disease.    *** ADDENDUM 05/17/2017  ***  Scattered locules of air adjacent to the anterior wall of the urinary   bladder and within the anterior abdomen, is likely related to the   patient's recentlaparoscopic procedure

## 2017-05-18 VITALS
RESPIRATION RATE: 18 BRPM | TEMPERATURE: 98 F | OXYGEN SATURATION: 95 % | HEART RATE: 83 BPM | SYSTOLIC BLOOD PRESSURE: 102 MMHG | DIASTOLIC BLOOD PRESSURE: 69 MMHG

## 2017-05-18 LAB
CULTURE RESULTS: NO GROWTH — SIGNIFICANT CHANGE UP
SPECIMEN SOURCE: SIGNIFICANT CHANGE UP

## 2017-05-18 PROCEDURE — 36415 COLL VENOUS BLD VENIPUNCTURE: CPT

## 2017-05-18 PROCEDURE — 86900 BLOOD TYPING SEROLOGIC ABO: CPT

## 2017-05-18 PROCEDURE — 86901 BLOOD TYPING SEROLOGIC RH(D): CPT

## 2017-05-18 PROCEDURE — 94640 AIRWAY INHALATION TREATMENT: CPT

## 2017-05-18 PROCEDURE — 85027 COMPLETE CBC AUTOMATED: CPT

## 2017-05-18 PROCEDURE — 87086 URINE CULTURE/COLONY COUNT: CPT

## 2017-05-18 PROCEDURE — 99285 EMERGENCY DEPT VISIT HI MDM: CPT | Mod: 25

## 2017-05-18 PROCEDURE — 85025 COMPLETE CBC W/AUTO DIFF WBC: CPT

## 2017-05-18 PROCEDURE — 81001 URINALYSIS AUTO W/SCOPE: CPT

## 2017-05-18 PROCEDURE — 85730 THROMBOPLASTIN TIME PARTIAL: CPT

## 2017-05-18 PROCEDURE — 96374 THER/PROPH/DIAG INJ IV PUSH: CPT | Mod: XU

## 2017-05-18 PROCEDURE — 80053 COMPREHEN METABOLIC PANEL: CPT

## 2017-05-18 PROCEDURE — 74177 CT ABD & PELVIS W/CONTRAST: CPT

## 2017-05-18 PROCEDURE — 96376 TX/PRO/DX INJ SAME DRUG ADON: CPT | Mod: XU

## 2017-05-18 PROCEDURE — 84702 CHORIONIC GONADOTROPIN TEST: CPT

## 2017-05-18 PROCEDURE — 76830 TRANSVAGINAL US NON-OB: CPT

## 2017-05-18 PROCEDURE — 86850 RBC ANTIBODY SCREEN: CPT

## 2017-05-18 PROCEDURE — 85610 PROTHROMBIN TIME: CPT

## 2017-05-18 PROCEDURE — 74020: CPT

## 2017-05-18 PROCEDURE — 96375 TX/PRO/DX INJ NEW DRUG ADDON: CPT | Mod: XU

## 2017-05-18 RX ORDER — DOCUSATE SODIUM 100 MG
1 CAPSULE ORAL
Qty: 0 | Refills: 0 | COMMUNITY
Start: 2017-05-18

## 2017-05-18 RX ADMIN — Medication 30 MILLIGRAM(S): at 00:51

## 2017-05-18 RX ADMIN — HYDROMORPHONE HYDROCHLORIDE 2 MILLIGRAM(S): 2 INJECTION INTRAMUSCULAR; INTRAVENOUS; SUBCUTANEOUS at 09:07

## 2017-05-18 RX ADMIN — HYDROMORPHONE HYDROCHLORIDE 2 MILLIGRAM(S): 2 INJECTION INTRAMUSCULAR; INTRAVENOUS; SUBCUTANEOUS at 09:37

## 2017-05-18 RX ADMIN — Medication 30 MILLIGRAM(S): at 00:18

## 2017-05-18 NOTE — DISCHARGE NOTE ADULT - CARE PROVIDER_API CALL
Efrain Fraire), Obstetrics and Gynecology  [address] [place] [address]  Phone: [phone]  Fax: [phone]

## 2017-05-18 NOTE — DISCHARGE NOTE ADULT - PLAN OF CARE
be happy take motrin 600mg every 6 hours, percocet available for breakthrough pain; no sexual intercourse; f/u in 1 month

## 2017-05-18 NOTE — DISCHARGE NOTE ADULT - MEDICATION SUMMARY - MEDICATIONS TO TAKE
I will START or STAY ON the medications listed below when I get home from the hospital:    Percocet 5/325 oral tablet  -- 1 tab(s) by mouth every 6 hours  -- Indication: For Abdominal pain    Motrin 600 mg oral tablet  -- 1 tab(s) by mouth every 6 hours  -- Indication: For Abdominal pain    docusate sodium 100 mg oral capsule  -- 1 cap(s) by mouth 2 times a day  -- Indication: For Abdominal pain

## 2017-05-18 NOTE — PROGRESS NOTE ADULT - SUBJECTIVE AND OBJECTIVE BOX
Pt seen and evaluated at bedside. She reports pain has improved and is now controlled s/p dilaudid, toradol, tylenol. She denies n/v. Abdominal exam now more soft with mild guarding on deep palpation of lower quadrants. Pt able to tolerate pelvic exam, uterus AV and mobile, bilateral adnexal tenderness. Dw Dr. Fraire, continue pain control w/ IV dilaudid, will consult pain management and f/u on general surgery reccs.
LOIS HINSON  [id number]  26y  Female  No Known Allergies    Patient seen and examined at bedside. Patient reports feeling much better than earlier in the morning. Received mag citrate. Having small bowel movements. Passing flatus. Had nothing to drink yet.      T(C): 36.7, Max: 36.8 (05-16 @ 23:11)  HR: 65 (65 - 134)  BP: 106/60 (93/58 - 117/76)  RR: 17 (16 - 22)  SpO2: 98% (97% - 100%)  Wt(kg): --      I & Os for current day (as of 05-17 @ 16:15)  =============================================  IN: 1000 ml / OUT: 0 ml / NET: 1000 ml    PE:  Gen: NAD  Abd: Soft, NDNT, no rebound, incision sites c/d/i                        14.0   10.3  )-----------( 327      ( 17 May 2017 00:37 )             38.8       05-17    138  |  102  |  9   ----------------------------<  127<H>  4.0   |  25  |  0.82    Ca    9.0      17 May 2017 00:37    TPro  8.1  /  Alb  4.6  /  TBili  0.3  /  DBili  x   /  AST  13<L>  /  ALT  25  /  AlkPhos  74  05-17          lactated ringers. 1000milliLiter(s) IV Continuous <Continuous>  HYDROmorphone   Tablet 1milliGRAM(s) Oral every 3 hours PRN  HYDROmorphone   Tablet 2milliGRAM(s) Oral every 3 hours PRN  polyethylene glycol 3350 17Gram(s) Oral two times a day  docusate sodium 100milliGRAM(s) Oral two times a day  ketorolac   Injectable 30milliGRAM(s) IV Push every 6 hours PRN  magnesium hydroxide Suspension 30milliLiter(s) Oral daily PRN      ABDOMINAL PAIN  No h/o HF  No pertinent family history in first degree relatives  Handoff  MEWS Score  Hypovitaminosis D  Endometriosis  Abdominal pain  Endometriosis  Status post surgery  S/P bunionectomy  ABD PAIN  5
LOIS HINSON  [id number]  26y  Female  No Known Allergies    Patient seen and examined. Patient reports having many watery BMs after PO bowel regimen. Pain improved from yesterday. Tolerating CLD, no n/v    T(C): 36.5, Max: 36.7 (05-17 @ 08:42)  HR: 66 (65 - 88)  BP: 104/69 (93/58 - 108/72)  RR: 16 (16 - 18)  SpO2: 98% (98% - 100%)  Wt(kg): --      I & Os for current day (as of 05-18 @ 07:25)  =============================================  IN: 1000 ml / OUT: 0 ml / NET: 1000 ml    PE:  Gen: NAD  Abd: Soft, NDNT, no rebound, no rigidity                        11.0   6.1   )-----------( 199      ( 17 May 2017 17:13 )             32.2       05-17    138  |  102  |  9   ----------------------------<  127<H>  4.0   |  25  |  0.82    Ca    9.0      17 May 2017 00:37    TPro  8.1  /  Alb  4.6  /  TBili  0.3  /  DBili  x   /  AST  13<L>  /  ALT  25  /  AlkPhos  74  05-17          HYDROmorphone   Tablet 1milliGRAM(s) Oral every 3 hours PRN  HYDROmorphone   Tablet 2milliGRAM(s) Oral every 3 hours PRN  polyethylene glycol 3350 17Gram(s) Oral two times a day  docusate sodium 100milliGRAM(s) Oral two times a day  ketorolac   Injectable 30milliGRAM(s) IV Push every 6 hours PRN  magnesium hydroxide Suspension 30milliLiter(s) Oral daily PRN      ABDOMINAL PAIN  No h/o HF  No pertinent family history in first degree relatives  Handoff  MEWS Score  Hypovitaminosis D  Endometriosis  Abdominal pain  Endometriosis  Status post surgery  S/P bunionectomy  ABD PAIN  5
Pt evaluated at bedside. She reports feeling well overall and that her pain has improved. She had a bowel movement and is tolerating clear liquid diet. She denies fever/chills, HA, dizziness, SOB, CP, palpitations, n/v, vaginal bleeding. She has been ambulating out of bed w/o assistance.    T(C): 36.5, Max: 36.5 (05-18 @ 05:18)  HR: 66 (66 - 88)  BP: 104/69 (104/66 - 104/69)  RR: 16 (16 - 18)  SpO2: 98% (98% - 100%)  Wt(kg): --  GA: NAD, A+OX3  CV: RRR, no MRG  Pulm: CTAB  Abd: +BS, soft, mild tenderness on palpation of lower quadrants, no rebound or guarding  Extrem: no calf tenderness    I & Os for current day (as of 05-18 @ 08:04)  =============================================  IN: 1000 ml / OUT: 0 ml / NET: 1000 ml                        11.0   6.1   )-----------( 199      ( 17 May 2017 17:13 )             32.2     05-17    138  |  102  |  9   ----------------------------<  127<H>  4.0   |  25  |  0.82    Ca    9.0      17 May 2017 00:37    TPro  8.1  /  Alb  4.6  /  TBili  0.3  /  DBili  x   /  AST  13<L>  /  ALT  25  /  AlkPhos  74  05-17      MEDICATIONS  (STANDING):  polyethylene glycol 3350 17Gram(s) Oral two times a day  docusate sodium 100milliGRAM(s) Oral two times a day    MEDICATIONS  (PRN):  HYDROmorphone   Tablet 1milliGRAM(s) Oral every 3 hours PRN Moderate Pain  HYDROmorphone   Tablet 2milliGRAM(s) Oral every 3 hours PRN Severe Pain  ketorolac   Injectable 30milliGRAM(s) IV Push every 6 hours PRN Moderate Pain (4 - 6)  magnesium hydroxide Suspension 30milliLiter(s) Oral daily PRN Constipation
Subjective: Patient feels that she had some initial pain relief in the morning, but that her abdominal pain is starting to increase in severity once again. The pain is focused on the bilateral lower quadrants without radiation to her back. The pain is crampy and "hard to describe." She feels that this pain is worse than her typical period pain and does not believe it can be related primarily to her endometriosis and period pain.     Additionally, she feels very constipated. She is passing flatus, but no bowel movement. She suffers from chronic constipation.     She has some nausea, but no vomiting. She denies fevers, chills, chest pain, shortness of breath, nausea, vomiting, diarrhea.     Vital Signs Last 24 Hrs  T(C): 36.6, Max: 36.8 (05-16 @ 23:11)  T(F): 97.9, Max: 98.2 (05-16 @ 23:11)  HR: 81 (81 - 134)  BP: 108/72 (93/58 - 117/76)  BP(mean): --  RR: 16 (16 - 22)  SpO2: 98% (97% - 100%)  I&O's Summary    I & Os for current day (as of 17 May 2017 14:01)  =============================================  IN: 750 ml / OUT: 0 ml / NET: 750 ml    MEDICATIONS  (STANDING):  lactated ringers. 1000milliLiter(s) IV Continuous <Continuous>  polyethylene glycol 3350 17Gram(s) Oral two times a day  docusate sodium 100milliGRAM(s) Oral two times a day    MEDICATIONS  (PRN):  HYDROmorphone   Tablet 1milliGRAM(s) Oral every 3 hours PRN Moderate Pain  HYDROmorphone   Tablet 2milliGRAM(s) Oral every 3 hours PRN Severe Pain  ketorolac   Injectable 30milliGRAM(s) IV Push every 6 hours PRN Moderate Pain (4 - 6)  magnesium hydroxide Suspension 30milliLiter(s) Oral daily PRN Constipation      PHYSICAL EXAM   Constitutional: Alert & Oriented x3, uncomfortable, cooperative  Gastrointestinal: mild abdominal distention, diffusely tender, + guarding, + rebound, positive bowel sounds,   Incision: dry and intact, no erythema or induration.   Extremities: no calf tenderness or swelling    LABS:                        14.0   10.3  )-----------( 327      ( 17 May 2017 00:37 )             38.8         138  |  102  |  9   ----------------------------<  127<H>  4.0   |  25  |  0.82    Ca    9.0      17 May 2017 00:37    TPro  8.1  /  Alb  4.6  /  TBili  0.3  /  DBili  x   /  AST  13<L>  /  ALT  25  /  AlkPhos  74      PT/INR - ( 17 May 2017 00:39 )   PT: 11.3 sec;   INR: 1.02          PTT - ( 17 May 2017 00:39 )  PTT:28.9 sec  Urinalysis Basic - ( 17 May 2017 05:31 )    Color: Yellow / Appearance: Clear / S.010 / pH: x  Gluc: x / Ketone: NEGATIVE  / Bili: NEGATIVE / Urobili: 0.2 E.U./dL   Blood: x / Protein: NEGATIVE mg/dL / Nitrite: NEGATIVE   Leuk Esterase: NEGATIVE / RBC: 5-10 /HPF / WBC < 5 /HPF   Sq Epi: x / Non Sq Epi: Moderate /HPF / Bacteria: Present /HPF

## 2017-05-18 NOTE — PROGRESS NOTE ADULT - ASSESSMENT
27 yo POD8 s/p laparoscopic excision re-admitted for management of pain and constipation, stable and improving

## 2017-05-18 NOTE — PROGRESS NOTE ADULT - PROBLEM SELECTOR PLAN 1
- pt improving overall  - continue pain management w/ oral medications  - bowel function improved  - encourage ambulation and incentive spirometry  - pt motivated for discharge today

## 2017-05-18 NOTE — PROGRESS NOTE ADULT - ASSESSMENT
26F s/p laparoscopic endometriosis resection one week ago now presents with constipation, resolving.  -having BMs after PO bowel regimen, pain improving, tolerating CLD  -can Advance diet as tolerated  -no acute surgical intervention warranted at this time  -plan per primary team

## 2017-05-18 NOTE — DISCHARGE NOTE ADULT - CARE PLAN
Principal Discharge DX:	Endometriosis  Goal:	be happy  Instructions for follow-up, activity and diet:	take motrin 600mg every 6 hours, percocet available for breakthrough pain; no sexual intercourse; f/u in 1 month  Secondary Diagnosis:	Constipation, unspecified constipation type

## 2017-05-18 NOTE — DISCHARGE NOTE ADULT - PATIENT PORTAL LINK FT
“You can access the Follow[employer] Patient Portal, offered by [facility], by registering with the following website: [email or web address]”

## 2017-05-18 NOTE — DISCHARGE NOTE ADULT - HOSPITAL COURSE
27 y/o admitted after Laparoscopic endometriosis excision with acute abdominal pain. CT abdomen negative for acute pathology. Abdominal pain improved with NPO, IVH and anti-constipation medications. Patient discharged on regular diet on oral pain medication.

## 2017-05-22 DIAGNOSIS — K59.00 CONSTIPATION, UNSPECIFIED: ICD-10-CM

## 2017-05-22 DIAGNOSIS — N80.9 ENDOMETRIOSIS, UNSPECIFIED: ICD-10-CM

## 2017-05-22 DIAGNOSIS — Z98.890 OTHER SPECIFIED POSTPROCEDURAL STATES: ICD-10-CM

## 2017-05-22 DIAGNOSIS — Z90.49 ACQUIRED ABSENCE OF OTHER SPECIFIED PARTS OF DIGESTIVE TRACT: ICD-10-CM

## 2017-06-09 ENCOUNTER — INPATIENT (INPATIENT)
Facility: HOSPITAL | Age: 27
LOS: 0 days | Discharge: HOME | DRG: 760 | End: 2017-06-10
Attending: SPECIALIST | Admitting: SPECIALIST
Payer: COMMERCIAL

## 2017-06-09 VITALS
DIASTOLIC BLOOD PRESSURE: 78 MMHG | SYSTOLIC BLOOD PRESSURE: 117 MMHG | OXYGEN SATURATION: 99 % | RESPIRATION RATE: 18 BRPM | WEIGHT: 130.07 LBS | HEART RATE: 100 BPM | TEMPERATURE: 98 F

## 2017-06-09 DIAGNOSIS — Z98.89 OTHER SPECIFIED POSTPROCEDURAL STATES: Chronic | ICD-10-CM

## 2017-06-09 DIAGNOSIS — N80.9 ENDOMETRIOSIS, UNSPECIFIED: ICD-10-CM

## 2017-06-09 DIAGNOSIS — R10.2 PELVIC AND PERINEAL PAIN: ICD-10-CM

## 2017-06-09 LAB
ALBUMIN SERPL ELPH-MCNC: 4.9 G/DL — SIGNIFICANT CHANGE UP (ref 3.3–5)
ALP SERPL-CCNC: 67 U/L — SIGNIFICANT CHANGE UP (ref 40–120)
ALT FLD-CCNC: 8 U/L — LOW (ref 10–45)
ANION GAP SERPL CALC-SCNC: 15 MMOL/L — SIGNIFICANT CHANGE UP (ref 5–17)
APPEARANCE UR: CLEAR — SIGNIFICANT CHANGE UP
AST SERPL-CCNC: 18 U/L — SIGNIFICANT CHANGE UP (ref 10–40)
BASOPHILS NFR BLD AUTO: 0.9 % — SIGNIFICANT CHANGE UP (ref 0–2)
BASOPHILS NFR BLD AUTO: 1 % — SIGNIFICANT CHANGE UP (ref 0–2)
BILIRUB SERPL-MCNC: 0.4 MG/DL — SIGNIFICANT CHANGE UP (ref 0.2–1.2)
BILIRUB UR-MCNC: NEGATIVE — SIGNIFICANT CHANGE UP
BUN SERPL-MCNC: 7 MG/DL — SIGNIFICANT CHANGE UP (ref 7–23)
CALCIUM SERPL-MCNC: 10.2 MG/DL — SIGNIFICANT CHANGE UP (ref 8.4–10.5)
CHLORIDE SERPL-SCNC: 100 MMOL/L — SIGNIFICANT CHANGE UP (ref 96–108)
CO2 SERPL-SCNC: 22 MMOL/L — SIGNIFICANT CHANGE UP (ref 22–31)
COLOR SPEC: YELLOW — SIGNIFICANT CHANGE UP
CREAT SERPL-MCNC: 0.8 MG/DL — SIGNIFICANT CHANGE UP (ref 0.5–1.3)
DIFF PNL FLD: NEGATIVE — SIGNIFICANT CHANGE UP
EOSINOPHIL NFR BLD AUTO: 8.4 % — HIGH (ref 0–6)
EOSINOPHIL NFR BLD AUTO: 9.3 % — HIGH (ref 0–6)
GLUCOSE SERPL-MCNC: 92 MG/DL — SIGNIFICANT CHANGE UP (ref 70–99)
GLUCOSE UR QL: NEGATIVE — SIGNIFICANT CHANGE UP
HCT VFR BLD CALC: 36.4 % — SIGNIFICANT CHANGE UP (ref 34.5–45)
HCT VFR BLD CALC: 40.3 % — SIGNIFICANT CHANGE UP (ref 34.5–45)
HGB BLD-MCNC: 12.5 G/DL — SIGNIFICANT CHANGE UP (ref 11.5–15.5)
HGB BLD-MCNC: 13.9 G/DL — SIGNIFICANT CHANGE UP (ref 11.5–15.5)
KETONES UR-MCNC: NEGATIVE — SIGNIFICANT CHANGE UP
LEUKOCYTE ESTERASE UR-ACNC: NEGATIVE — SIGNIFICANT CHANGE UP
LYMPHOCYTES # BLD AUTO: 29.2 % — SIGNIFICANT CHANGE UP (ref 13–44)
LYMPHOCYTES # BLD AUTO: 37.6 % — SIGNIFICANT CHANGE UP (ref 13–44)
MCHC RBC-ENTMCNC: 30.5 PG — SIGNIFICANT CHANGE UP (ref 27–34)
MCHC RBC-ENTMCNC: 30.8 PG — SIGNIFICANT CHANGE UP (ref 27–34)
MCHC RBC-ENTMCNC: 34.3 G/DL — SIGNIFICANT CHANGE UP (ref 32–36)
MCHC RBC-ENTMCNC: 34.5 G/DL — SIGNIFICANT CHANGE UP (ref 32–36)
MCV RBC AUTO: 88.6 FL — SIGNIFICANT CHANGE UP (ref 80–100)
MCV RBC AUTO: 89.7 FL — SIGNIFICANT CHANGE UP (ref 80–100)
MONOCYTES NFR BLD AUTO: 6.3 % — SIGNIFICANT CHANGE UP (ref 2–14)
MONOCYTES NFR BLD AUTO: 7.9 % — SIGNIFICANT CHANGE UP (ref 2–14)
NEUTROPHILS NFR BLD AUTO: 46.8 % — SIGNIFICANT CHANGE UP (ref 43–77)
NEUTROPHILS NFR BLD AUTO: 52.6 % — SIGNIFICANT CHANGE UP (ref 43–77)
NITRITE UR-MCNC: NEGATIVE — SIGNIFICANT CHANGE UP
PH UR: 7.5 — SIGNIFICANT CHANGE UP (ref 5–8)
PLATELET # BLD AUTO: 197 K/UL — SIGNIFICANT CHANGE UP (ref 150–400)
PLATELET # BLD AUTO: 229 K/UL — SIGNIFICANT CHANGE UP (ref 150–400)
POTASSIUM SERPL-MCNC: 4.1 MMOL/L — SIGNIFICANT CHANGE UP (ref 3.5–5.3)
POTASSIUM SERPL-SCNC: 4.1 MMOL/L — SIGNIFICANT CHANGE UP (ref 3.5–5.3)
PROT SERPL-MCNC: 7.7 G/DL — SIGNIFICANT CHANGE UP (ref 6–8.3)
PROT UR-MCNC: NEGATIVE MG/DL — SIGNIFICANT CHANGE UP
RBC # BLD: 4.06 M/UL — SIGNIFICANT CHANGE UP (ref 3.8–5.2)
RBC # BLD: 4.55 M/UL — SIGNIFICANT CHANGE UP (ref 3.8–5.2)
RBC # FLD: 12.9 % — SIGNIFICANT CHANGE UP (ref 10.3–16.9)
RBC # FLD: 13 % — SIGNIFICANT CHANGE UP (ref 10.3–16.9)
SODIUM SERPL-SCNC: 137 MMOL/L — SIGNIFICANT CHANGE UP (ref 135–145)
SP GR SPEC: 1.01 — SIGNIFICANT CHANGE UP (ref 1–1.03)
UROBILINOGEN FLD QL: 0.2 E.U./DL — SIGNIFICANT CHANGE UP
WBC # BLD: 5.7 K/UL — SIGNIFICANT CHANGE UP (ref 3.8–10.5)
WBC # BLD: 6.5 K/UL — SIGNIFICANT CHANGE UP (ref 3.8–10.5)
WBC # FLD AUTO: 5.7 K/UL — SIGNIFICANT CHANGE UP (ref 3.8–10.5)
WBC # FLD AUTO: 6.5 K/UL — SIGNIFICANT CHANGE UP (ref 3.8–10.5)

## 2017-06-09 PROCEDURE — 76856 US EXAM PELVIC COMPLETE: CPT | Mod: 26

## 2017-06-09 PROCEDURE — 99284 EMERGENCY DEPT VISIT MOD MDM: CPT

## 2017-06-09 PROCEDURE — 76830 TRANSVAGINAL US NON-OB: CPT | Mod: 26

## 2017-06-09 RX ORDER — MORPHINE SULFATE 50 MG/1
4 CAPSULE, EXTENDED RELEASE ORAL
Qty: 0 | Refills: 0 | Status: DISCONTINUED | OUTPATIENT
Start: 2017-06-09 | End: 2017-06-10

## 2017-06-09 RX ORDER — KETOROLAC TROMETHAMINE 30 MG/ML
30 SYRINGE (ML) INJECTION ONCE
Qty: 0 | Refills: 0 | Status: DISCONTINUED | OUTPATIENT
Start: 2017-06-09 | End: 2017-06-09

## 2017-06-09 RX ORDER — KETOROLAC TROMETHAMINE 30 MG/ML
30 SYRINGE (ML) INJECTION EVERY 6 HOURS
Qty: 0 | Refills: 0 | Status: DISCONTINUED | OUTPATIENT
Start: 2017-06-09 | End: 2017-06-10

## 2017-06-09 RX ORDER — TRAMADOL HYDROCHLORIDE 50 MG/1
50 TABLET ORAL ONCE
Qty: 0 | Refills: 0 | Status: DISCONTINUED | OUTPATIENT
Start: 2017-06-09 | End: 2017-06-09

## 2017-06-09 RX ORDER — TRAMADOL HYDROCHLORIDE 50 MG/1
25 TABLET ORAL ONCE
Qty: 0 | Refills: 0 | Status: DISCONTINUED | OUTPATIENT
Start: 2017-06-09 | End: 2017-06-09

## 2017-06-09 RX ORDER — MORPHINE SULFATE 50 MG/1
4 CAPSULE, EXTENDED RELEASE ORAL ONCE
Qty: 0 | Refills: 0 | Status: DISCONTINUED | OUTPATIENT
Start: 2017-06-09 | End: 2017-06-09

## 2017-06-09 RX ORDER — SODIUM CHLORIDE 9 MG/ML
1000 INJECTION INTRAMUSCULAR; INTRAVENOUS; SUBCUTANEOUS ONCE
Qty: 0 | Refills: 0 | Status: COMPLETED | OUTPATIENT
Start: 2017-06-09 | End: 2017-06-09

## 2017-06-09 RX ORDER — TRAMADOL HYDROCHLORIDE 50 MG/1
25 TABLET ORAL EVERY 4 HOURS
Qty: 0 | Refills: 0 | Status: DISCONTINUED | OUTPATIENT
Start: 2017-06-09 | End: 2017-06-10

## 2017-06-09 RX ORDER — ONDANSETRON 8 MG/1
4 TABLET, FILM COATED ORAL ONCE
Qty: 0 | Refills: 0 | Status: COMPLETED | OUTPATIENT
Start: 2017-06-09 | End: 2017-06-09

## 2017-06-09 RX ORDER — SODIUM CHLORIDE 9 MG/ML
1000 INJECTION, SOLUTION INTRAVENOUS
Qty: 0 | Refills: 0 | Status: DISCONTINUED | OUTPATIENT
Start: 2017-06-09 | End: 2017-06-10

## 2017-06-09 RX ADMIN — MORPHINE SULFATE 4 MILLIGRAM(S): 50 CAPSULE, EXTENDED RELEASE ORAL at 16:20

## 2017-06-09 RX ADMIN — TRAMADOL HYDROCHLORIDE 50 MILLIGRAM(S): 50 TABLET ORAL at 20:02

## 2017-06-09 RX ADMIN — Medication 30 MILLIGRAM(S): at 16:07

## 2017-06-09 RX ADMIN — SODIUM CHLORIDE 1500 MILLILITER(S): 9 INJECTION INTRAMUSCULAR; INTRAVENOUS; SUBCUTANEOUS at 16:07

## 2017-06-09 RX ADMIN — TRAMADOL HYDROCHLORIDE 25 MILLIGRAM(S): 50 TABLET ORAL at 23:16

## 2017-06-09 RX ADMIN — Medication 30 MILLIGRAM(S): at 16:53

## 2017-06-09 RX ADMIN — Medication 30 MILLIGRAM(S): at 22:40

## 2017-06-09 RX ADMIN — ONDANSETRON 4 MILLIGRAM(S): 8 TABLET, FILM COATED ORAL at 22:07

## 2017-06-09 RX ADMIN — TRAMADOL HYDROCHLORIDE 50 MILLIGRAM(S): 50 TABLET ORAL at 20:10

## 2017-06-09 RX ADMIN — MORPHINE SULFATE 4 MILLIGRAM(S): 50 CAPSULE, EXTENDED RELEASE ORAL at 16:53

## 2017-06-09 RX ADMIN — Medication 30 MILLIGRAM(S): at 22:07

## 2017-06-09 NOTE — CONSULT NOTE ADULT - SUBJECTIVE AND OBJECTIVE BOX
27 yo G0 w/ extensive surgical hx of endometriosis s/p laparoscopic excision of endometriosis #5 5/10 w/ cystectomy presents c/o severe pelvic pain that started this morning. She describes pain as 10/10, crampy in nature, w/ radiation to back and legs, unrelieved by percocet and medicinal marijuana. She also reports having vaginal spotting today and thinks that she may be having her period. She reports nausea w/o emesis. She denies CP, palpitations, SOB, abnormal vaginal discharge, dysuria. She last had a bowel movement this morning. She states she has been home-bound since date of surgery in May.  OBHx: denies  GYNHx: extensive hx of endometriosis s/p laparoscopy x 5 w/ cystectomy, IUD placed in 2014 27 yo G0 w/ extensive surgical hx of endometriosis s/p laparoscopic excision of endometriosis #5 5/10 w/ cystectomy presents c/o severe pelvic pain that started this morning. She describes pain as 10/10, crampy in nature, w/ radiation to back and legs, unrelieved by percocet and medicinal marijuana. She also reports having vaginal spotting today and thinks that she may be having her period. She reports nausea w/o emesis. She denies CP, palpitations, SOB, abnormal vaginal discharge, dysuria. She last had a bowel movement this morning. She states she has been home-bound since date of surgery in May.  OBHx: denies  GYNHx: extensive hx of endometriosis s/p laparoscopy x 5 w/ cystectomy, IUD placed in   MedHx: endometriosis  SurgHx: laparoscopy x 5 for endometriosis, [id number]    Vital Signs Last 24 Hrs  T(C): 36.8, Max: 36.8 ( @ 15:04)  T(F): 98.2, Max: 98.2 ( @ 15:04)  HR: 100 (100 - 100)  BP: 117/78 (117/78 - 117/78)  BP(mean): --  RR: 18 (18 - 18)  SpO2: 99% (99% - 99%)  GA: NAD, A+OX3  CV: RRR, no MRG  Pulm: CTAB   Abd: +BS, soft, diffusely tender to palpation, no rebound, pt flexes rectus muscles voluntarily w/ palpation of lower quadrants  Pelvic: no CMT, uterus mobile, no adnexal fullness appreciated                          13.9   5.7   )-----------( 229      ( 2017 15:43 )             40.3       137  |  100  |  7   ----------------------------<  92  4.1   |  22  |  0.80    Ca    10.2      2017 15:43    TPro  7.7  /  Alb  4.9  /  TBili  0.4  /  DBili  x   /  AST  18  /  ALT  8<L>  /  AlkPhos  67    Urinalysis Basic - ( 2017 16:37 )    Color: Yellow / Appearance: Clear / S.010 / pH: x  Gluc: x / Ketone: NEGATIVE  / Bili: NEGATIVE / Urobili: 0.2 E.U./dL   Blood: x / Protein: NEGATIVE mg/dL / Nitrite: NEGATIVE   Leuk Esterase: NEGATIVE / RBC: x / WBC x   Sq Epi: x / Non Sq Epi: x / Bacteria: x

## 2017-06-09 NOTE — ED ADULT TRIAGE NOTE - ARRIVAL INFO ADDITIONAL COMMENTS
Pt presented to ED with lower abdominal pain and nausea. Pt denies dysuria, fever, chills, vomiting. Pt has taken Percocet with no relief prior to ED arrival.

## 2017-06-09 NOTE — ED PROVIDER NOTE - ATTENDING CONTRIBUTION TO CARE
history of endometriosis s/p surgeries here with increased abd pain since yesterday.  diffuse, worse today.  tender throughout.  us done showing fluid in abdomen.  possible ruptured cyst vs ascites vs related to prior disease/surgery.  seen by gyn who would like to admit for observation, serial exams/ repeat hgb

## 2017-06-09 NOTE — ED ADULT NURSE NOTE - OBJECTIVE STATEMENT
Pt c/o sever lower abdominal pain and nausea since this morning. "I have endometriosis. I had five surgeries for it. this is my second period after the surgery. I think with the first period I ended up her too." Pt c/o inability to walk due to pain. PO Percocet did not relieve the discomfort at home.

## 2017-06-09 NOTE — ED PROVIDER NOTE - OBJECTIVE STATEMENT
27 y/o f with h/o endometrioses presents to ED c/o pelvic pain from last night worsening today. She recently had surgery for  a left ovarian cystectomy and removal of endometrial pockets and scar tissue. She states pain is severe and believes her menstrual cycle is beginning. Denies vaginal bleeding, fever, n, v, diarrhea. Normal BM. Currently taking percocet but pain is not relieved with pain meds.

## 2017-06-09 NOTE — ED PROVIDER NOTE - MEDICAL DECISION MAKING DETAILS
Patient with severe pelvic pain afebrile with normal labs. Pelvic sonogram shows ascites and hemoperitoneum from unknown etiology. Gyn agree to admit and do serial abd exam and trend labs. Pain is better controlled.

## 2017-06-09 NOTE — ED PROVIDER NOTE - CARE PLAN
Principal Discharge DX:	Pelvic pain in female  Secondary Diagnosis:	Endometriosis  Secondary Diagnosis:	Hemoperitoneum

## 2017-06-09 NOTE — H&P ADULT - HISTORY OF PRESENT ILLNESS
27 yo G0 w/ extensive surgical hx of endometriosis s/p laparoscopic excision of endometriosis #5 5/10 w/ cystectomy presents c/o severe pelvic pain that started this morning. She describes pain as 10/10, crampy in nature, w/ radiation to back and legs, unrelieved by percocet and medicinal marijuana. She also reports having vaginal spotting today and thinks that she may be having her period. She reports nausea w/o emesis. She denies CP, palpitations, SOB, abnormal vaginal discharge, dysuria. She last had a bowel movement this morning. She states she has been home-bound since date of surgery in May.  OBHx: denies  GYNHx: extensive hx of endometriosis s/p laparoscopy x 5 w/ cystectomy, IUD placed in 2014  MedHx: endometriosis  SurgHx: laparoscopy x 5 for endometriosis, [id number]  NKDA    Vital Signs Last 24 Hrs  T(C): 36.6, Max: 36.8 (06-09 @ 15:04)  T(F): 97.8, Max: 98.3 (06-09 @ 22:07)  HR: 60 (59 - 100)  BP: 97/60 (92/56 - 117/78)  RR: 18 (18 - 18)  SpO2: 97% (97% - 99%)    INDICATION: 26-year-old G0 female with pelvic pain. History of  endometriosis and right ovarian cystectomy last month. LMP: 5/15/2017    TECHNIQUE: Transabdominal views of the pelvis were obtained followed by   transvaginal views for better visualization of the ovaries.    COMPARISON: Pelvic ultrasound exams from 5/17/2017 and 5/9/2017. CT scans   of abdomen and pelvis from 5/17/2017, 5/9/2017, and 5/8/2016.    FINDINGS:  These images demonstrate the uterus to be anteverted. The   uterus is normal in size and shape, measuring 6.9 x 2.8 x 3.9 cm. No   myometrial abnormalities are seen. The endometrium is 0.5 cm in   thickness, which is normal. Intrauterine device again present.    The ovaries are normal in size and appearance bilaterally. The right   ovary is normal in size, measuring 2.3 x 1.9 x 2.3 cm with a calculated   volume of 5 mL. The left ovary is normal in size, measuring 2.5 x 1.1 x   2.1 cm with a calculated volume of 3 mL. Doppler evaluation demonstrates   flow to both ovaries with no evidence of torsion.    A moderate amount of hyperechoic free fluid is seen within the pelvis   compatible with hemoperitoneum.      IMPRESSION:  1. Since 5/17/2017, there is a moderate amount of complex pelvic ascites.   This could represent hemoperitoneum. The differential diagnosis would   include infection.      On repeat physical exam, the patient is feeling better after pain medication. Overall appears well. Her abdomen is soft, nontender, no rebound or guarding.    H 13.9 to 12.5

## 2017-06-09 NOTE — ED ADULT TRIAGE NOTE - CHIEF COMPLAINT QUOTE
"I had a surgery for endometriosis on 5/10. I'm having sever abdominal pain. I'm spotting too but it might be my period."

## 2017-06-09 NOTE — H&P ADULT - ASSESSMENT
25yo F with pelvic pain. Sono showed probable moderate amount of hemoperitoneum therefore will admit for observation. Patient currently stable.

## 2017-06-10 VITALS
TEMPERATURE: 98 F | DIASTOLIC BLOOD PRESSURE: 63 MMHG | SYSTOLIC BLOOD PRESSURE: 97 MMHG | RESPIRATION RATE: 18 BRPM | HEART RATE: 83 BPM | OXYGEN SATURATION: 98 %

## 2017-06-10 LAB
CULTURE RESULTS: NO GROWTH — SIGNIFICANT CHANGE UP
HCT VFR BLD CALC: 33.4 % — LOW (ref 34.5–45)
HCT VFR BLD CALC: 38.9 % — SIGNIFICANT CHANGE UP (ref 34.5–45)
HGB BLD-MCNC: 11.3 G/DL — LOW (ref 11.5–15.5)
HGB BLD-MCNC: 13.1 G/DL — SIGNIFICANT CHANGE UP (ref 11.5–15.5)
MCHC RBC-ENTMCNC: 30.5 PG — SIGNIFICANT CHANGE UP (ref 27–34)
MCHC RBC-ENTMCNC: 30.5 PG — SIGNIFICANT CHANGE UP (ref 27–34)
MCHC RBC-ENTMCNC: 33.7 G/DL — SIGNIFICANT CHANGE UP (ref 32–36)
MCHC RBC-ENTMCNC: 33.8 G/DL — SIGNIFICANT CHANGE UP (ref 32–36)
MCV RBC AUTO: 90 FL — SIGNIFICANT CHANGE UP (ref 80–100)
MCV RBC AUTO: 90.7 FL — SIGNIFICANT CHANGE UP (ref 80–100)
PLATELET # BLD AUTO: 175 K/UL — SIGNIFICANT CHANGE UP (ref 150–400)
PLATELET # BLD AUTO: 209 K/UL — SIGNIFICANT CHANGE UP (ref 150–400)
RBC # BLD: 3.71 M/UL — LOW (ref 3.8–5.2)
RBC # BLD: 4.29 M/UL — SIGNIFICANT CHANGE UP (ref 3.8–5.2)
RBC # FLD: 13 % — SIGNIFICANT CHANGE UP (ref 10.3–16.9)
RBC # FLD: 13.2 % — SIGNIFICANT CHANGE UP (ref 10.3–16.9)
SPECIMEN SOURCE: SIGNIFICANT CHANGE UP
WBC # BLD: 4.1 K/UL — SIGNIFICANT CHANGE UP (ref 3.8–10.5)
WBC # BLD: 5.4 K/UL — SIGNIFICANT CHANGE UP (ref 3.8–10.5)
WBC # FLD AUTO: 4.1 K/UL — SIGNIFICANT CHANGE UP (ref 3.8–10.5)
WBC # FLD AUTO: 5.4 K/UL — SIGNIFICANT CHANGE UP (ref 3.8–10.5)

## 2017-06-10 PROCEDURE — 76830 TRANSVAGINAL US NON-OB: CPT

## 2017-06-10 PROCEDURE — 96374 THER/PROPH/DIAG INJ IV PUSH: CPT

## 2017-06-10 PROCEDURE — 36415 COLL VENOUS BLD VENIPUNCTURE: CPT

## 2017-06-10 PROCEDURE — 85027 COMPLETE CBC AUTOMATED: CPT

## 2017-06-10 PROCEDURE — 87086 URINE CULTURE/COLONY COUNT: CPT

## 2017-06-10 PROCEDURE — 85025 COMPLETE CBC W/AUTO DIFF WBC: CPT

## 2017-06-10 PROCEDURE — 80053 COMPREHEN METABOLIC PANEL: CPT

## 2017-06-10 PROCEDURE — 96375 TX/PRO/DX INJ NEW DRUG ADDON: CPT

## 2017-06-10 PROCEDURE — 99285 EMERGENCY DEPT VISIT HI MDM: CPT | Mod: 25

## 2017-06-10 PROCEDURE — 81003 URINALYSIS AUTO W/O SCOPE: CPT

## 2017-06-10 PROCEDURE — 76856 US EXAM PELVIC COMPLETE: CPT

## 2017-06-10 RX ORDER — METOCLOPRAMIDE HCL 10 MG
10 TABLET ORAL ONCE
Qty: 0 | Refills: 0 | Status: DISCONTINUED | OUTPATIENT
Start: 2017-06-10 | End: 2017-06-10

## 2017-06-10 RX ORDER — IBUPROFEN 200 MG
1 TABLET ORAL
Qty: 0 | Refills: 0 | COMMUNITY

## 2017-06-10 RX ORDER — ONDANSETRON 8 MG/1
4 TABLET, FILM COATED ORAL ONCE
Qty: 0 | Refills: 0 | Status: DISCONTINUED | OUTPATIENT
Start: 2017-06-10 | End: 2017-06-10

## 2017-06-10 RX ADMIN — TRAMADOL HYDROCHLORIDE 25 MILLIGRAM(S): 50 TABLET ORAL at 00:30

## 2017-06-10 RX ADMIN — SODIUM CHLORIDE 125 MILLILITER(S): 9 INJECTION, SOLUTION INTRAVENOUS at 06:55

## 2017-06-10 NOTE — PROGRESS NOTE ADULT - ASSESSMENT
27yo F admitted for observation overnight in the setting of pelvic pain and hemoperitoneum on sono.   Will d/c IVH and then re-check cbc at 2pm. Continue serial exams and pain control.   d/w Dr. Finkelstein.

## 2017-06-10 NOTE — DISCHARGE NOTE ADULT - CARE PROVIDER_API CALL
Efrain Fraire), Obstetrics and Gynecology  [address] [place] [address]  Phone: [phone]  Fax: [phone]

## 2017-06-10 NOTE — DISCHARGE NOTE ADULT - HOSPITAL COURSE
Patient admitted for observation and pain control. Remained stable. Pain well controlled on toradol and ultram. Uneventful hospital course.

## 2017-06-10 NOTE — PROGRESS NOTE ADULT - SUBJECTIVE AND OBJECTIVE BOX
Pt seen and evaluated at bedside. She reports feeling much better and wants to go home. She states abdominal pain has been well controlled, denies HA, dizziness, CP, palpitations, SOB, n/v, heavy vaginal bleeding. She has been ambulating w/o assistance and tolerating clear liquids. Physical exam unremarkable, abdomen soft, mildly tender to palpation of all quadrants, no rebound or guarding. Hb up to 13. VSS. Pt has appt for pain management on Monday and is scheduled for pelvic floor rehabilitation within 2 weeks. Dw Dr. Finkelstein, pt stable for d/c, has pain meds at home for relief.

## 2017-06-10 NOTE — PROGRESS NOTE ADULT - SUBJECTIVE AND OBJECTIVE BOX
Serial exam: patient states pain is well controlled on pain medications. Denies SOB or shoulder pain. Abd exam: soft, mildly tender to palpation, no rebound or guarding.    BP 97/60 HR 60    continue to monitor. am cbc.

## 2017-06-10 NOTE — DISCHARGE NOTE ADULT - CARE PLAN
Principal Discharge DX:	Pelvic pain in female  Goal:	feel better!  Instructions for follow-up, activity and diet:	return to the hospital for worsening abdominal pain. pain medication as needed.

## 2017-06-10 NOTE — PROGRESS NOTE ADULT - SUBJECTIVE AND OBJECTIVE BOX
GYN PROGRESS NOTE PGY2    Patient evaluated at the bedside. No acute events. Her abdominal exam was unchanged overnight.  No shortness of breath or shoulder pain. She is resting with no complaints. She did require pain medication overnight. States her pain has largely improved but is still there. On DI137ic overnight. Hb this AM 11.3 from 12.5. Will stop IV hydration. On sips diet.    T(C): 35.9, Max: 36.8 (06-09 @ 15:04)  HR: 74 (59 - 100)  BP: 96/60 (92/56 - 117/78)  RR: 16 (16 - 18)  SpO2: 98% (97% - 99%)    GEN: patient appears well  LUNGS: no respiratory distress  ABD: soft, mildly tender to deep palpation in lower abdomen bilaterally, no rebound, no guarding  EXT: no calf tenderness

## 2017-06-10 NOTE — DISCHARGE NOTE ADULT - PATIENT PORTAL LINK FT
“You can access the Follow[employer] Patient Portal, offered by [facility], by registering with the following website: [email or web address]”

## 2017-06-13 DIAGNOSIS — K66.1 HEMOPERITONEUM: ICD-10-CM

## 2017-06-13 DIAGNOSIS — Z98.890 OTHER SPECIFIED POSTPROCEDURAL STATES: ICD-10-CM

## 2017-06-13 DIAGNOSIS — E55.9 VITAMIN D DEFICIENCY, UNSPECIFIED: ICD-10-CM

## 2017-07-14 ENCOUNTER — EMERGENCY (EMERGENCY)
Facility: HOSPITAL | Age: 27
LOS: 1 days | Discharge: HOME | End: 2017-07-14
Attending: EMERGENCY MEDICINE | Admitting: EMERGENCY MEDICINE
Payer: COMMERCIAL

## 2017-07-14 VITALS
DIASTOLIC BLOOD PRESSURE: 82 MMHG | HEART RATE: 80 BPM | RESPIRATION RATE: 20 BRPM | TEMPERATURE: 98 F | SYSTOLIC BLOOD PRESSURE: 119 MMHG | OXYGEN SATURATION: 100 %

## 2017-07-14 VITALS
TEMPERATURE: 98 F | RESPIRATION RATE: 22 BRPM | HEART RATE: 137 BPM | SYSTOLIC BLOOD PRESSURE: 122 MMHG | DIASTOLIC BLOOD PRESSURE: 69 MMHG | OXYGEN SATURATION: 100 % | WEIGHT: 130.07 LBS | HEIGHT: 67 IN

## 2017-07-14 DIAGNOSIS — N80.9 ENDOMETRIOSIS, UNSPECIFIED: ICD-10-CM

## 2017-07-14 DIAGNOSIS — Z98.89 OTHER SPECIFIED POSTPROCEDURAL STATES: Chronic | ICD-10-CM

## 2017-07-14 DIAGNOSIS — Z79.891 LONG TERM (CURRENT) USE OF OPIATE ANALGESIC: ICD-10-CM

## 2017-07-14 DIAGNOSIS — Z72.0 TOBACCO USE: ICD-10-CM

## 2017-07-14 DIAGNOSIS — R10.2 PELVIC AND PERINEAL PAIN: ICD-10-CM

## 2017-07-14 LAB
ANION GAP SERPL CALC-SCNC: 16 MMOL/L — SIGNIFICANT CHANGE UP (ref 5–17)
APPEARANCE UR: CLEAR — SIGNIFICANT CHANGE UP
BASOPHILS NFR BLD AUTO: 0.9 % — SIGNIFICANT CHANGE UP (ref 0–2)
BILIRUB UR-MCNC: NEGATIVE — SIGNIFICANT CHANGE UP
BUN SERPL-MCNC: 5 MG/DL — LOW (ref 7–23)
CALCIUM SERPL-MCNC: 9.7 MG/DL — SIGNIFICANT CHANGE UP (ref 8.4–10.5)
CHLORIDE SERPL-SCNC: 101 MMOL/L — SIGNIFICANT CHANGE UP (ref 96–108)
CO2 SERPL-SCNC: 21 MMOL/L — LOW (ref 22–31)
COLOR SPEC: YELLOW — SIGNIFICANT CHANGE UP
CREAT SERPL-MCNC: 0.8 MG/DL — SIGNIFICANT CHANGE UP (ref 0.5–1.3)
DIFF PNL FLD: NEGATIVE — SIGNIFICANT CHANGE UP
EOSINOPHIL NFR BLD AUTO: 5.8 % — SIGNIFICANT CHANGE UP (ref 0–6)
GLUCOSE SERPL-MCNC: 104 MG/DL — HIGH (ref 70–99)
GLUCOSE UR QL: NEGATIVE — SIGNIFICANT CHANGE UP
HCG SERPL-ACNC: <.1 MIU/ML — SIGNIFICANT CHANGE UP
HCT VFR BLD CALC: 41.3 % — SIGNIFICANT CHANGE UP (ref 34.5–45)
HGB BLD-MCNC: 14.2 G/DL — SIGNIFICANT CHANGE UP (ref 11.5–15.5)
KETONES UR-MCNC: NEGATIVE — SIGNIFICANT CHANGE UP
LEUKOCYTE ESTERASE UR-ACNC: NEGATIVE — SIGNIFICANT CHANGE UP
LYMPHOCYTES # BLD AUTO: 35.5 % — SIGNIFICANT CHANGE UP (ref 13–44)
MCHC RBC-ENTMCNC: 30.7 PG — SIGNIFICANT CHANGE UP (ref 27–34)
MCHC RBC-ENTMCNC: 34.4 G/DL — SIGNIFICANT CHANGE UP (ref 32–36)
MCV RBC AUTO: 89.4 FL — SIGNIFICANT CHANGE UP (ref 80–100)
MONOCYTES NFR BLD AUTO: 8.1 % — SIGNIFICANT CHANGE UP (ref 2–14)
NEUTROPHILS NFR BLD AUTO: 49.7 % — SIGNIFICANT CHANGE UP (ref 43–77)
NITRITE UR-MCNC: NEGATIVE — SIGNIFICANT CHANGE UP
PH UR: 7.5 — SIGNIFICANT CHANGE UP (ref 5–8)
PLATELET # BLD AUTO: 214 K/UL — SIGNIFICANT CHANGE UP (ref 150–400)
POTASSIUM SERPL-MCNC: 3.8 MMOL/L — SIGNIFICANT CHANGE UP (ref 3.5–5.3)
POTASSIUM SERPL-SCNC: 3.8 MMOL/L — SIGNIFICANT CHANGE UP (ref 3.5–5.3)
PROT UR-MCNC: NEGATIVE MG/DL — SIGNIFICANT CHANGE UP
RBC # BLD: 4.62 M/UL — SIGNIFICANT CHANGE UP (ref 3.8–5.2)
RBC # FLD: 12.4 % — SIGNIFICANT CHANGE UP (ref 10.3–16.9)
SODIUM SERPL-SCNC: 138 MMOL/L — SIGNIFICANT CHANGE UP (ref 135–145)
SP GR SPEC: 1.01 — SIGNIFICANT CHANGE UP (ref 1–1.03)
UROBILINOGEN FLD QL: 0.2 E.U./DL — SIGNIFICANT CHANGE UP
WBC # BLD: 5.8 K/UL — SIGNIFICANT CHANGE UP (ref 3.8–10.5)
WBC # FLD AUTO: 5.8 K/UL — SIGNIFICANT CHANGE UP (ref 3.8–10.5)

## 2017-07-14 PROCEDURE — 80048 BASIC METABOLIC PNL TOTAL CA: CPT

## 2017-07-14 PROCEDURE — 81003 URINALYSIS AUTO W/O SCOPE: CPT

## 2017-07-14 PROCEDURE — 76856 US EXAM PELVIC COMPLETE: CPT

## 2017-07-14 PROCEDURE — 99284 EMERGENCY DEPT VISIT MOD MDM: CPT | Mod: 25

## 2017-07-14 PROCEDURE — 96376 TX/PRO/DX INJ SAME DRUG ADON: CPT

## 2017-07-14 PROCEDURE — 96374 THER/PROPH/DIAG INJ IV PUSH: CPT

## 2017-07-14 PROCEDURE — 99285 EMERGENCY DEPT VISIT HI MDM: CPT | Mod: 25

## 2017-07-14 PROCEDURE — 76856 US EXAM PELVIC COMPLETE: CPT | Mod: 26

## 2017-07-14 PROCEDURE — 96375 TX/PRO/DX INJ NEW DRUG ADDON: CPT

## 2017-07-14 PROCEDURE — 93005 ELECTROCARDIOGRAM TRACING: CPT

## 2017-07-14 PROCEDURE — 85025 COMPLETE CBC W/AUTO DIFF WBC: CPT

## 2017-07-14 PROCEDURE — 76830 TRANSVAGINAL US NON-OB: CPT | Mod: 26

## 2017-07-14 PROCEDURE — 36415 COLL VENOUS BLD VENIPUNCTURE: CPT

## 2017-07-14 PROCEDURE — 84702 CHORIONIC GONADOTROPIN TEST: CPT

## 2017-07-14 PROCEDURE — 93010 ELECTROCARDIOGRAM REPORT: CPT

## 2017-07-14 PROCEDURE — 76830 TRANSVAGINAL US NON-OB: CPT

## 2017-07-14 RX ORDER — ONDANSETRON 8 MG/1
1 TABLET, FILM COATED ORAL
Qty: 20 | Refills: 0 | OUTPATIENT
Start: 2017-07-14

## 2017-07-14 RX ORDER — HYDROMORPHONE HYDROCHLORIDE 2 MG/ML
1 INJECTION INTRAMUSCULAR; INTRAVENOUS; SUBCUTANEOUS
Qty: 24 | Refills: 0 | OUTPATIENT
Start: 2017-07-14 | End: 2017-07-18

## 2017-07-14 RX ORDER — ONDANSETRON 8 MG/1
4 TABLET, FILM COATED ORAL ONCE
Qty: 0 | Refills: 0 | Status: COMPLETED | OUTPATIENT
Start: 2017-07-14 | End: 2017-07-14

## 2017-07-14 RX ORDER — HYDROMORPHONE HYDROCHLORIDE 2 MG/ML
1 INJECTION INTRAMUSCULAR; INTRAVENOUS; SUBCUTANEOUS ONCE
Qty: 0 | Refills: 0 | Status: DISCONTINUED | OUTPATIENT
Start: 2017-07-14 | End: 2017-07-14

## 2017-07-14 RX ORDER — KETOROLAC TROMETHAMINE 30 MG/ML
30 SYRINGE (ML) INJECTION ONCE
Qty: 0 | Refills: 0 | Status: DISCONTINUED | OUTPATIENT
Start: 2017-07-14 | End: 2017-07-14

## 2017-07-14 RX ORDER — SODIUM CHLORIDE 9 MG/ML
1000 INJECTION INTRAMUSCULAR; INTRAVENOUS; SUBCUTANEOUS
Qty: 0 | Refills: 0 | Status: DISCONTINUED | OUTPATIENT
Start: 2017-07-14 | End: 2017-07-18

## 2017-07-14 RX ORDER — METOCLOPRAMIDE HCL 10 MG
1 TABLET ORAL
Qty: 20 | Refills: 0 | OUTPATIENT
Start: 2017-07-14

## 2017-07-14 RX ORDER — SODIUM CHLORIDE 9 MG/ML
1000 INJECTION INTRAMUSCULAR; INTRAVENOUS; SUBCUTANEOUS ONCE
Qty: 0 | Refills: 0 | Status: COMPLETED | OUTPATIENT
Start: 2017-07-14 | End: 2017-07-14

## 2017-07-14 RX ADMIN — SODIUM CHLORIDE 125 MILLILITER(S): 9 INJECTION INTRAMUSCULAR; INTRAVENOUS; SUBCUTANEOUS at 17:35

## 2017-07-14 RX ADMIN — Medication 30 MILLIGRAM(S): at 16:17

## 2017-07-14 RX ADMIN — HYDROMORPHONE HYDROCHLORIDE 1 MILLIGRAM(S): 2 INJECTION INTRAMUSCULAR; INTRAVENOUS; SUBCUTANEOUS at 17:22

## 2017-07-14 RX ADMIN — ONDANSETRON 4 MILLIGRAM(S): 8 TABLET, FILM COATED ORAL at 21:11

## 2017-07-14 RX ADMIN — ONDANSETRON 4 MILLIGRAM(S): 8 TABLET, FILM COATED ORAL at 16:16

## 2017-07-14 RX ADMIN — SODIUM CHLORIDE 4000 MILLILITER(S): 9 INJECTION INTRAMUSCULAR; INTRAVENOUS; SUBCUTANEOUS at 16:15

## 2017-07-14 RX ADMIN — HYDROMORPHONE HYDROCHLORIDE 1 MILLIGRAM(S): 2 INJECTION INTRAMUSCULAR; INTRAVENOUS; SUBCUTANEOUS at 21:11

## 2017-07-14 RX ADMIN — HYDROMORPHONE HYDROCHLORIDE 1 MILLIGRAM(S): 2 INJECTION INTRAMUSCULAR; INTRAVENOUS; SUBCUTANEOUS at 17:35

## 2017-07-14 RX ADMIN — HYDROMORPHONE HYDROCHLORIDE 1 MILLIGRAM(S): 2 INJECTION INTRAMUSCULAR; INTRAVENOUS; SUBCUTANEOUS at 16:15

## 2017-07-14 RX ADMIN — HYDROMORPHONE HYDROCHLORIDE 1 MILLIGRAM(S): 2 INJECTION INTRAMUSCULAR; INTRAVENOUS; SUBCUTANEOUS at 19:00

## 2017-07-14 RX ADMIN — HYDROMORPHONE HYDROCHLORIDE 1 MILLIGRAM(S): 2 INJECTION INTRAMUSCULAR; INTRAVENOUS; SUBCUTANEOUS at 19:28

## 2017-07-14 RX ADMIN — Medication 30 MILLIGRAM(S): at 16:30

## 2017-07-14 NOTE — CONSULT NOTE ADULT - SUBJECTIVE AND OBJECTIVE BOX
27 yo G0 w/ extensive surgical hx of endometriosis s/p laparoscopic excision of endometriosis #5 5/10 w/ cystectomy presents c/o severe pelvic pain that started this morning. She describes pain as 10/10, crampy in nature, w/ radiation to back and legs, unrelieved by percocet and medicinal marijuana. No vaginal bleeding. Irregular periods. She reports nausea w/o emesis. She denies CP, palpitations, SOB, abnormal vaginal discharge, dysuria. She states she has been home-bound since date of surgery in May.  OBHx: denies  GYNHx: extensive hx of endometriosis s/p laparoscopy x 5 w/ cystectomy, IUD placed in 2014  MedHx: endometriosis  SurgHx: laparoscopy x 5 for endometriosis, [id number]  NKDA 25 yo G0 w/ extensive surgical hx of endometriosis s/p laparoscopic excision of endometriosis #5 5/10 w/ cystectomy presents c/o severe pelvic pain that started this morning. She describes pain as 10/10, crampy in nature, w/ radiation to back and legs, unrelieved by percocet and medicinal marijuana. No vaginal bleeding. Irregular periods. She reports nausea w/o emesis. She denies CP, palpitations, SOB, abnormal vaginal discharge, dysuria. She states she has been home-bound since date of surgery in May.  OBHx: denies  GYNHx: extensive hx of endometriosis s/p laparoscopy x 5 w/ cystectomy, IUD placed in 2014  MedHx: endometriosis  SurgHx: laparoscopy x 5 for endometriosis, [id number]  NKDA    T 98.1 H 72 /71  GEN: appears in pain  ABD: diffuse tenderness, no rebound, no guarding  SSE: cervix appears closed, no bleeding  PELVIC: mobile uterus, no adnexal masses, generalized tenderness    W 5.8 H 14.2 P 214  lytes wnl  UA neg 25 yo G0 w/ extensive surgical hx of endometriosis s/p laparoscopic excision of endometriosis #5 5/10 w/ cystectomy presents c/o severe pelvic pain that started this morning. She describes pain as 10/10, crampy in nature, w/ radiation to back and legs, unrelieved by percocet and medicinal marijuana. No vaginal bleeding. Irregular periods. She reports nausea w/o emesis. She denies CP, palpitations, SOB, abnormal vaginal discharge, dysuria. She states she has been home-bound since date of surgery in May.  OBHx: denies  GYNHx: extensive hx of endometriosis s/p laparoscopy x 5 w/ cystectomy, IUD placed in 2014  MedHx: endometriosis  SurgHx: laparoscopy x 5 for endometriosis, [id number]  NKDA    T 98.1 H 72 /71  GEN: appears in pain  ABD: diffuse tenderness, no rebound, no guarding  SSE: cervix appears closed, no bleeding  PELVIC: mobile uterus, no adnexal masses, generalized tenderness    W 5.8 H 14.2 P 214  lytes wnl  UA neg    < from: US Transvaginal (07.14.17 @ 20:16) >  PELVIC ULTRASOUND dated 7/14/2017 7:51 PM    INDICATION: 26-year-old female with pelvic pain. History of   endometriosis. Beta-hCG: Not available at the time of dictation; LMP:   7/7/2017    TECHNIQUE: Transabdominal views of the pelvis were obtained followed by   transvaginal views for better visualization of the ovaries.    PRIOR STUDIES: Pelvic ultrasounds 6/9/2017, 5/17/2017 and 5/9/2017.    FINDINGS:   These images demonstrate the uterus to be anteverted. The uterus is   normal in size and shape, measuring 5.4 x 2.3 x 2.2 cm. No myometrial   abnormalities are seen. The endometrium is 0.5 cm in thickness, which is   normal. Intrauterine device appears in appropriate position.    The right ovary is normal in size, measuring 3.0 x 2.7 x 2.2 cm with a   calculated volume of 9 mL. No right ovarian masses are seen. The left   ovary is normal in size, measuring 2.6 x 2.1 x 1.9 cm with a calculated   volume of 5 mL. This includes a 1.4 x 0.9 x 1.0 cm left corpus luteal   cyst. Doppler evaluation demonstrates flow to both ovaries with no   evidence of torsion.    A physiologic amount of free fluid is seen in the cul-de-sac.      IMPRESSION:   1.4 cm left corpus luteal cyst, otherwise no acute sonographic   abnormality.    < end of copied text >

## 2017-07-14 NOTE — ED PROVIDER NOTE - PROGRESS NOTE DETAILS
GYN consulted and will yara. Seen by gyn - pelvic benign but pt c/o cont pain.  Additional pain meds ordered.  Await dispo by gyn - likely admit. Pt cleared for dc by gyn to shannon Fraire this week.

## 2017-07-14 NOTE — ED ADULT TRIAGE NOTE - CHIEF COMPLAINT QUOTE
pt c/o severe pelvic pain specific to the left side. pt suffers from endometriosis, with surgery scheduled soon. pt took Percocet PTA with no relief.

## 2017-07-14 NOTE — ED PROVIDER NOTE - CONSTITUTIONAL, MLM
normal... Well appearing, well nourished, awake, alert, oriented to person, place, time/situation and in severe painful distress.

## 2017-07-14 NOTE — CONSULT NOTE ADULT - ASSESSMENT
pending final review pending final review    Plan to send home after ultrasound. Send po pain control. Follow up with GYN in 4 days. Return for worsening symptoms. 27 yo F w/ chronic pelvic pain.   U/S shows corpus luteal cyst. Send home with po pain control. Follow up with GYN in 4 days. Return for worsening symptoms. Plan discussed with Dr. Holden PGY 3 and Dr. Fraire.

## 2017-07-14 NOTE — ED PROVIDER NOTE - MEDICAL DECISION MAKING DETAILS
Pt w h/o endometriosis and severe pelvic pain c/o severe pelvic pain/llq pain w/o bleeding or discharge.  Suspect endometriosis related sx.  Plan labs, pain meds, pelvic u/s, gyn consult, reassess.

## 2017-07-14 NOTE — ED PROVIDER NOTE - OBJECTIVE STATEMENT
27 yo female h/o endometriosis s/p ablation and resection x 3, s/p appendectomy c/o severe LLQ pain similar to endometriosis related pain - progressive over the past 1-2 wk, v severe today, unrelieved w percocet at home.  No dysuria, freq, + abd distension - unchanged from baseline.  No fever, + feels hot.  No n/v/d, last bm yest - nl.  No abnl discharge or bleeding; menses irreg and v light - no recent bleeding, last intercourse late June w .  Pain 6-9/10, sharp, no radiation.

## 2017-07-14 NOTE — ED ADULT NURSE NOTE - OBJECTIVE STATEMENT
Pt came to ED complaining of severe lower abd and pelvic pain starting today. Pt has hx of endometriosis since April 2017. Pt reports tenderness upon palpation. Pt reports nausea, no vomiting. Pt is alert and oriented x3.  Pt denies bleeding or discharge. Pt is alert and oriented x3, denies chest pain, SOB, /GI symptoms.

## 2017-10-05 NOTE — PATIENT PROFILE ADULT. - NSTOBACCONEVERSMOKERY/N_GEN_A
G-Codes  Functional Limitations: Swallowing  Swallow Current Status (): At least 20 percent but less than 40 percent impaired, limited or restricted  Swallow Goal Status ():  At least 1 percent but less than 20 percent impaired, limited or restricted      Therapy Time:   Individual Concurrent Group Co-treatment   Time In 1015         Time Out 1040         Minutes 25                   Theodore 25, 10/5/2017, 11:03 AM Yes, Non-Core measure site...

## 2018-04-09 ENCOUNTER — EMERGENCY (EMERGENCY)
Facility: HOSPITAL | Age: 28
LOS: 1 days | Discharge: HOME | End: 2018-04-09
Attending: EMERGENCY MEDICINE | Admitting: EMERGENCY MEDICINE
Payer: COMMERCIAL

## 2018-04-09 VITALS
TEMPERATURE: 98 F | SYSTOLIC BLOOD PRESSURE: 142 MMHG | OXYGEN SATURATION: 99 % | WEIGHT: 122.58 LBS | RESPIRATION RATE: 18 BRPM | DIASTOLIC BLOOD PRESSURE: 76 MMHG | HEART RATE: 99 BPM

## 2018-04-09 VITALS
TEMPERATURE: 99 F | HEART RATE: 71 BPM | SYSTOLIC BLOOD PRESSURE: 103 MMHG | DIASTOLIC BLOOD PRESSURE: 89 MMHG | RESPIRATION RATE: 18 BRPM | OXYGEN SATURATION: 99 %

## 2018-04-09 DIAGNOSIS — Z98.890 OTHER SPECIFIED POSTPROCEDURAL STATES: Chronic | ICD-10-CM

## 2018-04-09 DIAGNOSIS — Z98.89 OTHER SPECIFIED POSTPROCEDURAL STATES: Chronic | ICD-10-CM

## 2018-04-09 LAB
ALBUMIN SERPL ELPH-MCNC: 4.7 G/DL — SIGNIFICANT CHANGE UP (ref 3.3–5)
ALP SERPL-CCNC: 64 U/L — SIGNIFICANT CHANGE UP (ref 40–120)
ALT FLD-CCNC: 18 U/L — SIGNIFICANT CHANGE UP (ref 10–45)
ANION GAP SERPL CALC-SCNC: 12 MMOL/L — SIGNIFICANT CHANGE UP (ref 5–17)
APPEARANCE UR: CLEAR — SIGNIFICANT CHANGE UP
AST SERPL-CCNC: 21 U/L — SIGNIFICANT CHANGE UP (ref 10–40)
BASOPHILS NFR BLD AUTO: 0.6 % — SIGNIFICANT CHANGE UP (ref 0–2)
BILIRUB SERPL-MCNC: 0.2 MG/DL — SIGNIFICANT CHANGE UP (ref 0.2–1.2)
BILIRUB UR-MCNC: (no result)
BUN SERPL-MCNC: 7 MG/DL — SIGNIFICANT CHANGE UP (ref 7–23)
CALCIUM SERPL-MCNC: 9.3 MG/DL — SIGNIFICANT CHANGE UP (ref 8.4–10.5)
CHLORIDE SERPL-SCNC: 100 MMOL/L — SIGNIFICANT CHANGE UP (ref 96–108)
CO2 SERPL-SCNC: 23 MMOL/L — SIGNIFICANT CHANGE UP (ref 22–31)
COLOR SPEC: YELLOW — SIGNIFICANT CHANGE UP
CREAT SERPL-MCNC: 0.78 MG/DL — SIGNIFICANT CHANGE UP (ref 0.5–1.3)
DIFF PNL FLD: NEGATIVE — SIGNIFICANT CHANGE UP
EOSINOPHIL NFR BLD AUTO: 5.2 % — SIGNIFICANT CHANGE UP (ref 0–6)
GLUCOSE SERPL-MCNC: 89 MG/DL — SIGNIFICANT CHANGE UP (ref 70–99)
GLUCOSE UR QL: NEGATIVE — SIGNIFICANT CHANGE UP
HCT VFR BLD CALC: 38.5 % — SIGNIFICANT CHANGE UP (ref 34.5–45)
HGB BLD-MCNC: 13 G/DL — SIGNIFICANT CHANGE UP (ref 11.5–15.5)
KETONES UR-MCNC: (no result) MG/DL
LEUKOCYTE ESTERASE UR-ACNC: NEGATIVE — SIGNIFICANT CHANGE UP
LYMPHOCYTES # BLD AUTO: 26 % — SIGNIFICANT CHANGE UP (ref 13–44)
MCHC RBC-ENTMCNC: 31.1 PG — SIGNIFICANT CHANGE UP (ref 27–34)
MCHC RBC-ENTMCNC: 33.8 G/DL — SIGNIFICANT CHANGE UP (ref 32–36)
MCV RBC AUTO: 92.1 FL — SIGNIFICANT CHANGE UP (ref 80–100)
MONOCYTES NFR BLD AUTO: 6.8 % — SIGNIFICANT CHANGE UP (ref 2–14)
NEUTROPHILS NFR BLD AUTO: 61.4 % — SIGNIFICANT CHANGE UP (ref 43–77)
NITRITE UR-MCNC: NEGATIVE — SIGNIFICANT CHANGE UP
PH UR: 7.5 — SIGNIFICANT CHANGE UP (ref 5–8)
PLATELET # BLD AUTO: 190 K/UL — SIGNIFICANT CHANGE UP (ref 150–400)
POTASSIUM SERPL-MCNC: 3.9 MMOL/L — SIGNIFICANT CHANGE UP (ref 3.5–5.3)
POTASSIUM SERPL-SCNC: 3.9 MMOL/L — SIGNIFICANT CHANGE UP (ref 3.5–5.3)
PROT SERPL-MCNC: 7.6 G/DL — SIGNIFICANT CHANGE UP (ref 6–8.3)
PROT UR-MCNC: (no result) MG/DL
RBC # BLD: 4.18 M/UL — SIGNIFICANT CHANGE UP (ref 3.8–5.2)
RBC # FLD: 13 % — SIGNIFICANT CHANGE UP (ref 10.3–16.9)
SODIUM SERPL-SCNC: 135 MMOL/L — SIGNIFICANT CHANGE UP (ref 135–145)
SP GR SPEC: 1.01 — SIGNIFICANT CHANGE UP (ref 1–1.03)
UROBILINOGEN FLD QL: 0.2 E.U./DL — SIGNIFICANT CHANGE UP
WBC # BLD: 7.1 K/UL — SIGNIFICANT CHANGE UP (ref 3.8–10.5)
WBC # FLD AUTO: 7.1 K/UL — SIGNIFICANT CHANGE UP (ref 3.8–10.5)

## 2018-04-09 PROCEDURE — 36415 COLL VENOUS BLD VENIPUNCTURE: CPT

## 2018-04-09 PROCEDURE — 99284 EMERGENCY DEPT VISIT MOD MDM: CPT | Mod: 25

## 2018-04-09 PROCEDURE — 80053 COMPREHEN METABOLIC PANEL: CPT

## 2018-04-09 PROCEDURE — 76830 TRANSVAGINAL US NON-OB: CPT | Mod: 26

## 2018-04-09 PROCEDURE — 76830 TRANSVAGINAL US NON-OB: CPT

## 2018-04-09 PROCEDURE — 85025 COMPLETE CBC W/AUTO DIFF WBC: CPT

## 2018-04-09 PROCEDURE — 76856 US EXAM PELVIC COMPLETE: CPT

## 2018-04-09 PROCEDURE — 99284 EMERGENCY DEPT VISIT MOD MDM: CPT

## 2018-04-09 PROCEDURE — 76856 US EXAM PELVIC COMPLETE: CPT | Mod: 26

## 2018-04-09 PROCEDURE — 81001 URINALYSIS AUTO W/SCOPE: CPT

## 2018-04-09 RX ORDER — TRAMADOL HYDROCHLORIDE 50 MG/1
50 TABLET ORAL ONCE
Qty: 0 | Refills: 0 | Status: DISCONTINUED | OUTPATIENT
Start: 2018-04-09 | End: 2018-04-09

## 2018-04-09 RX ADMIN — TRAMADOL HYDROCHLORIDE 50 MILLIGRAM(S): 50 TABLET ORAL at 18:16

## 2018-04-09 RX ADMIN — TRAMADOL HYDROCHLORIDE 50 MILLIGRAM(S): 50 TABLET ORAL at 17:01

## 2018-04-09 NOTE — ED PROVIDER NOTE - OBJECTIVE STATEMENT
Pt w/ PMHx PTSD, endometriosis s/p multiple laparoscopy surgeries, cystectomy, hysterectomy in Dec 2017 at [facility] p/w 10 days of pelvic pain. Pt reports since the surgery, pt has been undergoing "pelvic PT." Pt reports last PT session 10 days ago, in which her PT was massaging her abdominal wall. Pt reports since then worsening pain. Pt reports chronic pain, for which she has been taking Diclofenac, and intermittently Percocet (last 8 days ago), but states this pain is worse. Pain is located in the diffuse pelvic region, R > L. Pt reports she still has both ovaries and is concerned she has a cyst. Chronic nausea, unchanged. No vomiting, or constipation. + soft stools, last episodes today. Pt is passing gas. No vaginal bleeding or discharge. Pt is not sexually active. No f/c. Pt is tolerating PO w/o change in pain. Pt last saw her surgeon approx 3 weeks ago, but not since onset of this pain. Pt w/ PMHx PTSD, endometriosis s/p multiple laparoscopy surgeries, cystectomy, hysterectomy in Dec 2017 at [facility] w/ Dr Sousa p/w 10 days of pelvic pain. Pt reports since the surgery, pt has been undergoing "pelvic PT." Pt reports last PT session 10 days ago, in which her PT was massaging her abdominal wall. Pt reports since then worsening pain. Pt reports chronic pain, for which she has been taking Diclofenac, and intermittently Percocet (last 8 days ago), but states this pain is worse. Pain is located in the diffuse pelvic region, R > L. Pt reports she still has both ovaries and is concerned she has a cyst. Chronic nausea, unchanged. No vomiting, or constipation. + soft stools, last episodes today. Pt is passing gas. No vaginal bleeding or discharge. Pt is not sexually active. No f/c. Pt is tolerating PO w/o change in pain. Pt last saw her surgeon approx 3 weeks ago, but not since onset of this pain.

## 2018-04-09 NOTE — ED PROVIDER NOTE - CHIEF COMPLAINT
The patient is a 27y Female complaining of pelvic pain. The patient is a 27y Female w a long standing history of endometriosis, most recently s/p TLH about 10 weeks ago c/o of a 10 complaining of pelvic pain X 10 days The patient is a 27y Female w a long standing history of endometriosis, complaining of pelvic pain X 10 days

## 2018-04-09 NOTE — PROGRESS NOTE ADULT - SUBJECTIVE AND OBJECTIVE BOX
PT has a long standing history of endometriosis and multiple endo excision surgeries, most recently s/p TLH about 2 1/2 months ago. Pt was doing well post surgery until 10 days ago when she developed pain immediately post Physical therapy.  Pain has continued over the last 10 days. LLQ pain and some umbilical pain. Frantz po. + void. No emesis. always feels nauseated-not new.  no fever. OOB. Pt has responded well to PT until the visit 10 days ago. PT has central sentization for which she takes cymbalta 90mg and Lyrica-now 50mg.  She has IC but has not been strict on the IC diet until the last few days.    PE: VSS AF  Labs- WNL  U/S-pending  Abd- soft.  discomfort on left side. No R/G.    Imp: LLQ pain after PT.  R/o ovarian cyst.  Await US results.  Strict IC diet.  IF stable, f/u as an outpt    Johanna Sousa MD

## 2018-04-09 NOTE — ED PROVIDER NOTE - PROGRESS NOTE DETAILS
Pt's gyn Dr Sousa coming to see the pt US negative. Pt seen in the ED by gyn attending Dr Sousa, feels flare of IC. Pt feeling better. Pt for d/c

## 2018-04-09 NOTE — ED ADULT TRIAGE NOTE - CHIEF COMPLAINT QUOTE
Pt w hx of endometriosis and adenomyosis , s/p total hysterectomy in Dec 2017, c/o severe pelvic pain 7/10 at this time, states took 75 mg of Diclofenac at 1300. Denies any vag bleeding, fevers, chills.

## 2018-04-09 NOTE — ED ADULT NURSE NOTE - PSH
H/O abdominal hysterectomy    S/P bunionectomy  right 2012  Status post surgery  endometriosis surgery: 10/2014, 05/2015, 10/2015   appendectomy during the first surgery 10/2014

## 2018-04-09 NOTE — ED PROVIDER NOTE - PSYCHIATRIC, MLM
Alert and oriented to person, place, time/situation. anxious affect. no apparent risk to self or others.

## 2018-04-09 NOTE — ED PROVIDER NOTE - MEDICAL DECISION MAKING DETAILS
Pt p/w acute on chronic pelvic pain, hx endometriosis, hysterectomy, still has ovaries. Not sexually active in months. No vag bleeding or discharge. No vomiting, tolerating PO, small BMs and normal flatus. Benign abdominal exam. DDx includes exacerbation of chronic pain 2/2 endometriosis, less likely ovarian cyst +/- torsion, rupture, other pathology. Check TVUS, analgesia. Dispo pending w/u and clinical status

## 2018-04-13 DIAGNOSIS — Z79.891 LONG TERM (CURRENT) USE OF OPIATE ANALGESIC: ICD-10-CM

## 2018-04-13 DIAGNOSIS — R10.9 UNSPECIFIED ABDOMINAL PAIN: ICD-10-CM

## 2018-04-13 DIAGNOSIS — Z90.710 ACQUIRED ABSENCE OF BOTH CERVIX AND UTERUS: ICD-10-CM

## 2018-04-13 DIAGNOSIS — R10.2 PELVIC AND PERINEAL PAIN: ICD-10-CM

## 2018-04-13 DIAGNOSIS — Z79.899 OTHER LONG TERM (CURRENT) DRUG THERAPY: ICD-10-CM

## 2018-04-13 DIAGNOSIS — Z90.6 ACQUIRED ABSENCE OF OTHER PARTS OF URINARY TRACT: ICD-10-CM

## 2019-09-04 NOTE — ED ADULT NURSE NOTE - NS ED NURSE IV DC DT
14-Jul-2017 21:12 Mohs Method Verbiage: An incision at a 45 degree angle following the standard Mohs approach was done and the specimen was harvested as a microscopic controlled layer.

## 2020-09-17 NOTE — ED ADULT TRIAGE NOTE - HEIGHT IN FEET
"[facility] Nurse Inpatient Ostomy Assessment     Assessment of New Ileostomy  Surgery date: 9-10-20 and 9-13-20  Surgeon:  Dr Morrison /    Procedure:     9-10-20:  Exploratory laparotomy, right colectomy with end ileostomy and transverse mucous fistula, rigid proctoscopy,                  drainage of pelvis (Suleman)     9-13-20: .  Reexploration of recent laparotomy: Abdominal washout; Anterior resection; Rigid proctoscopy with rectal stump leak                   Testing; Removal of abdominal drain and replacement of new pelvic drain (Dmitry)     9-17-20: EXPLORATORY LAPAROTOMY, ABDOMINAL WASHOUT, CREATION COLOSTOMY with incisional VAC (Fermin)    Related diagnosis:  Cecal perforation, sigmoid phlegmon and contained pelvic perforation.     [facility] Assessment & Physical Exam:        Current status: remains intubated and sedated                                   Stomas viable and await return of function                                    Return from  OR with Preveena cannister full and unable to contain drainage.        [facility]  Photo: 9-17-20 RLQ Ileostomy; LUQ Colostomy; closed mid-line incision               Ileostomy: RLQ        Stoma size:  1 3/8\"    Stoma appearance:  Rounded, red, moist, protrudes, lumen @ apex    Mucocutaneous junction: intact with sutures    Peristomal complication(s): intact, no erythema    Abdominal assessment:  Rounded, distended and firm    Surgical site(s): surgical dressing intact, no drainage    NG still in place? Yes    Output: scant small green output in appliance    Pain: unable to determine    Colostomy: LUQ  Stoma size:  1 1/2\"    Stoma appearance:  Rounded, red, moist, protrudes, lumen @ apex    Mucocutaneous junction: intact with sutures    Peristomal complication(s): intact, no erythema    Abdominal assessment:  Rounded, distended and firm    Surgical site(s): surgical dressing intact, no drainage    NG still in place? Yes    Output: scant " small green output in appliance    Pain: unable to determine    Midline incision:    Incision:  Long mid-line  incison well approximated with staples                   No Ann Marie-incisional erythema scattered erosion along Rt superior margin                   Distal incision with large serous drainage from incision and GAB site. Drainage undermined VAC seal, unable to                    Maintain suction. No Purulent drainage noted.        Incision VAC from OR removed as saturated with serous drainage     Wound cleaned with MicroKlenz     3M No Sting Skin prep to ann marie-wound skin     Incision window framed with VAC drape     Black sponge strip  X 2 to incision line     Secured with VAC drape     Adaptor padded to @ distal aspect of incision     Difficult dressing change secondary to continual oozing of serous drainage from distal incision and insertion site of GAB     VAC @ 125mm/hg cont suction on regular VAC  Therapy. No air leak noted     Mepilex border trimmed and placed around GAB in attempt to contain drainage from around GAB      OR sponges removed:    1. 1x long incisional sponge       Sponges applied:      1. 2 x black granufoam strips     2. 1 x black foam as padding for adaptor                         Current pouching system : [place] NI convex to each stoma     Pouch last changed:  913-20 in OR; 9-15-20; 9-17    Reason for pouch change today: stoma assessment;          Learning and comprehension:   not present. Pt vented and sedated.     Psychosocial:  TBD      WOC Plan:         Pouching product plan:  Brian NI convex 57mm with high output pouch to RLQ ilesotomy and drainable pouch to LUQ colostomy    Plan for incisional VAC change M-TH beginning next week        Pt support system on discharge:     WOC recommend home care?  TBD      WOC follow-up plan: Friday       Objective Data:       Current Diet/Nutrition:   Orders Placed This Encounter      NPO for Medical/Clinical Reasons Except for: No  Exceptions       Output:  I/O last 3 completed shifts:  In: 7013.65 [I.V.:5316.65; IV Piggyback:500]  Out: 2846 [Urine:1445; Drains:1285; Stool:116]      Labs:   Recent Labs   Lab 09/17/20  0345 09/17/20  0042  09/14/20  0400   ALBUMIN 2.3*  --    < > 2.1*   PREALB 4*  --   --   --    HGB 8.9*  --    < > 8.7*   INR 2.08*  --    < > 1.77*   WBC 38.8*  --    < > 32.2*   A1C  --   --   --  5.1   CRP  --  65.4*  --   --     < > = values in this interval not displayed.         Lacho Risk Assessment:   Sensory Perception: 1-->completely limited  Moisture: 1-->constantly moist  Activity: 1-->bedfast  Mobility: 1-->completely immobile  Nutrition: 1-->very poor  Friction and Shear: 1-->problem  Lacho Score: 6      WOC Interventions:       Patient's chart evaluated    Focus of today's visit: stoma assessment, prosthetic refitting; incisional VAC dressing change    Pouch:  Brian NI 57mm cut to fit convex with tape and high output pouch,     Participant(s) in teaching session today: No family present    Change made with ostomy management today: resume convexity  To both stoma sites     Supplies: In pt room     Preparation for discharge: No discharge preparations started    Registered for supply samples? TBD    Discussed plan of care with: Nursing and patient     Ghazal Farrell, RN, CWOCN       5

## 2022-02-07 NOTE — DISCHARGE NOTE ADULT - PATIENT PORTAL LINK FT
“You can access the [facility] Patient Portal, offered by [facility], by registering with the following website: [email or web address]” Universal Safety Interventions

## 2022-03-29 NOTE — ED PROVIDER NOTE - ABDOMINAL TENDER
What Type Of Note Output Would You Prefer (Optional)?: Standard Output How Severe Is Your Rash?: mild Is This A New Presentation, Or A Follow-Up?: Rash left lower quadrant

## 2022-04-22 NOTE — ED ADULT TRIAGE NOTE - TEMPERATURE IN FAHRENHEIT (DEGREES F)
98.2 Hemigard Postcare Instructions: The HEMIGARD strips are to remain completely dry for at least 5-7 days.

## 2022-10-06 NOTE — ED PROVIDER NOTE - NSCAREINITIATED _GEN_ER
Pt c/o increased pedal edema for 4-5 days. States feet are swollen and feel \"picky\" which has been impacting her ability to ambulate. States she has been having to use her walker again d/t discomfort.     Would like to discuss further with her cardiology team and asked that message be forwarded there.         Director, Kelsie(Attending)

## 2023-01-18 NOTE — ED ADULT NURSE NOTE - PAIN RATING/NUMBER SCALE (0-10): ACTIVITY
8 That inpatient services furnished since the previous certification or recertification were, and continue to be required: for treatment that could reasonably be expected to improve the patient's condition; or, for diagnostic study;    That the hospital records show that the services furnished were: intensive treatment services; admission and related services necessary for diagnostic study or equivalent services;    That the patient continues to need, on a daily basis active inpatient treatment furnished directly by or requiring the supervision of inpatient psychiatric facility personnel.

## 2023-04-06 NOTE — ED ADULT NURSE NOTE - PAIN: PRESENCE, MLM
Physical Therapy: Daily Note   Patient: Jud Ross (72 y.o. female)   Examination Date:   Plan of Care/Certification Expiration Date: 23    No data recorded   :  1978 # of Visits since [facility]:   3   MRN: [id number]  CSN: [id number] Start of Care Date:   3/30/2023   Insurance: Payor: Ligia Floyd / Plan: [address] [place] [address] / Product Type: *No Product type* /   Insurance ID: [id number] - ([facility]) Secondary Insurance (if applicable):    Referring Physician: KI Fan - MADELINE Linares   PCP: Alex Galvez MD Visits to Date/Visits Approved: 3 / 6    No Show/Cancelled Appts:   /       Medical Diagnosis: Pelvic and perineal pain [R10.2]  Stress incontinence (female) (male) [N39.3] Vaginal Pain R10.2, Stress Incontinence N39.3  Treatment Diagnosis: muscle weakness, mixed urinary incontinence        SUBJECTIVE EXAMINATION   Pain Level: Pain Screening  Patient Currently in Pain: No    Patient Comments: Subjective: No changes. OBJECTIVE EXAMINATION       TREATMENT     Exercises:      Treatment Reasoning    Exercise 1: supine legs up wall x 3 minutes  Exercise 2: supine ta contraction alone, alt ue, alt le, alt ue/le x 10 each  Exercise 3: supine ta contraction with bridge, with single leg bridge x 10 each  Exercise 4: supine posterior pelvic tilt x 10  Exercise 5: sit to stand with ta contraction x 10  Exercise 6: seated t-ball ta contraction alone, alt ue, alt le, alt ue/le x 10 each-smaller green ball from gym  Exercise 7: supine downtraining on biofeedback in slight trendelenburg x 3 min  Exercise 8: supine pfm contraction in slight trendelenburg alone (10 x 10 sec), hooklying alone (10 x 10 sec), with t-band for hip abd (10 x 10 sec), with towel for hip add (5 x 10 sec)  *progress to upright as able  Exercise 10: bladder diary and pfm tip sheet issued 2023                                 ASSESSMENT     Assessment: Assessment: Patient did well with session.   SHe forgot to
complains of pain/discomfort

## 2023-08-15 NOTE — H&P ADULT - PROBLEM SELECTOR PROBLEM 1
[facility] -- Alexey Briceno, Agus Sepulveda, Mick, Harjit Andujar: Office # [phone]    Follow Up:  A NADIR genao    Subjective/Observations: Patient seen and examined. Patient awake, alert, resting in bed. No complaints of chest pain, dyspnea, Tolerating O2 via nasal cannula. + cough. Did not respond to much questions.     REVIEW OF SYSTEMS: All other review of systems are negative unless indicated above    PAST MEDICAL & SURGICAL HISTORY:  TBI (traumatic brain injury)      HTN (hypertension)      Atrial fibrillation      Peripheral neuropathy      Major depression      HLD (hyperlipidemia)      CAD (coronary artery disease)      BPH (benign prostatic hyperplasia)      Pneumonia due to COVID-19 virus  2022      Hemorrhage in the brain      H/O craniotomy    MEDICATIONS  (STANDING):  atorvastatin 40 milliGRAM(s) Oral at bedtime  chlorhexidine 2% Cloths 1 Application(s) Topical every 24 hours  dextrose 5%. 1000 milliLiter(s) (100 mL/Hr) IV Continuous <Continuous>  dextrose 5%. 1000 milliLiter(s) (50 mL/Hr) IV Continuous <Continuous>  dextrose 50% Injectable 25 Gram(s) IV Push once  dextrose 50% Injectable 12.5 Gram(s) IV Push once  dextrose 50% Injectable 25 Gram(s) IV Push once  glucagon  Injectable 1 milliGRAM(s) IntraMuscular once  heparin   Injectable 5000 Unit(s) SubCutaneous every 8 hours  hydrocortisone sodium succinate Injectable 50 milliGRAM(s) IV Push every 8 hours  insulin lispro (ADMELOG) corrective regimen sliding scale   SubCutaneous every 6 hours  mirtazapine 7.5 milliGRAM(s) Oral daily  mupirocin 2% Nasal 1 Application(s) Both Nostrils every 12 hours  pantoprazole  Injectable 40 milliGRAM(s) IV Push daily  piperacillin/tazobactam IVPB.. 3.375 Gram(s) IV Intermittent every 8 hours  sodium chloride 3%  Inhalation 4 milliLiter(s) Inhalation every 12 hours    MEDICATIONS  (PRN):  dextrose Oral Gel 15 Gram(s) Oral once PRN Blood Glucose LESS THAN 70 milliGRAM(s)/deciliter  sodium chloride 0.9% lock flush 10 milliLiter(s) IV Push every 1 hour PRN Pre/post blood products, medications, blood draw, and to maintain line patency    Allergies    No Known Allergies    Intolerances      Vital Signs Last 24 Hrs  T(C): 36.6 (15 Aug 2023 05:01), Max: 37.1 (14 Aug 2023 20:25)  T(F): 97.9 (15 Aug 2023 05:), Max: 98.8 (14 Aug 2023 20:25)  HR: 78 (15 Aug 2023 05:01) (78 - 109)  BP: 132/71 (15 Aug 2023 05:) (98/67 - 136/81)  BP(mean): 98 (14 Aug 2023 18:00) (78 - 100)  RR: 18 (15 Aug 2023 05:) (12 - 18)  SpO2: 97% (15 Aug 2023 05:) (93% - 98%)    Parameters below as of 15 Aug 2023 05:01  Patient On (Oxygen Delivery Method): nasal cannula  O2 Flow (L/min): 3    I&O's Summary    14 Aug 2023 07:01  -  15 Aug 2023 07:00  --------------------------------------------------------  IN: 375 mL / OUT: 1835 mL / NET: -1460 mL          TELE: A fib 80s  PHYSICAL EXAM:  Constitutional: NAD, awake and alert  HEENT: Moist Mucous Membranes, Anicteric  Pulmonary: Non-labored, breath sounds are clear bilaterally, No wheezing, rales or rhonchi  Cardiovascular: Regular, S1 and S2, No murmurs, No rubs, gallops or clicks  Gastrointestinal:  soft, nontender, nondistended   Lymph: trace peripheral edema. No lymphadenopathy.   Skin: No visible rashes or ulcers.  Neuro: Minimally verbal, unable to follow commands   Psych:  Mood & affect appropriate      LABS: All Labs Reviewed:                        8.5    9.95  )-----------( 176      ( 14 Aug 2023 08:40 )             26.1                         10.9   50.24 )-----------( 347      ( 13 Aug 2023 04:26 )             36.5                         12.9   45.52 )-----------( 269      ( 12 Aug 2023 21:50 )             42.6     14 Aug 2023 05:22    138    |  104    |  42     ----------------------------<  171    4.0     |  23     |  2.00   13 Aug 2023 14:56    137    |  103    |  40     ----------------------------<  143    4.8     |  18     |  1.90   13 Aug 2023 09:33    137    |  103    |  39     ----------------------------<  253    4.4     |  14     |  1.80     Ca    8.2        14 Aug 2023 05:22  Ca    8.2        13 Aug 2023 14:56  Ca    7.7        13 Aug 2023 09:33  Phos  5.0       14 Aug 2023 05:22  Phos  5.4       13 Aug 2023 09:33  Phos  5.9       13 Aug 2023 04:26  Mg     1.7       14 Aug 2023 05:22  Mg     1.6       13 Aug 2023 09:33  Mg     1.7       13 Aug 2023 04:26    TPro  5.4    /  Alb  3.1    /  TBili  0.6    /  DBili  x      /  AST  9      /  ALT  14     /  AlkPhos  51     14 Aug 2023 05:22  TPro  5.4    /  Alb  2.5    /  TBili  0.4    /  DBili  x      /  AST  13     /  ALT  15     /  AlkPhos  71     13 Aug 2023 09:33  TPro  5.3    /  Alb  2.6    /  TBili  0.4    /  DBili  x      /  AST  11     /  ALT  14     /  AlkPhos  79     13 Aug 2023 04:26   LIVER FUNCTIONS - ( 14 Aug 2023 05:22 )  Alb: 3.1 g/dL / Pro: 5.4 g/dL / ALK PHOS: 51 U/L / ALT: 14 U/L / AST: 9 U/L / GGT: x           PT/INR - ( 15 Aug 2023 05:25 )   PT: 14.7 sec;   INR: 1.27 ratio         PTT - ( 15 Aug 2023 05:25 )  PTT:28.6 secTroponin I, High Sensitivity Result: 76.9 ng/L (23 @ 09:33)  Troponin I, High Sensitivity Result: 6.7 ng/L (23 @ 21:50)  Lactate, Blood: 1.5 mmol/L (23 @ 05:22)  Lactate, Blood: 5.0 mmol/L (23 @ 18:30)  Lactate, Blood: 6.7 mmol/L (23 @ 14:56)    12 Lead ECG:   Ventricular Rate 98 BPM    QRS Duration 76 ms    Q-T Interval 342 ms    QTC Calculation(Bazett) 436 ms    R Axis 213 degrees    T Axis 19 degrees    Diagnosis Line Atrial fibrillation  Right ventricular hypertrophy  Inferior infarct (cited on or before 22-MAY-2022)  Anterolateral infarct (cited on or before 22-MAY-2022)  Abnormal ECG  When compared with ECG of 10-IVAN-2023 20:29,  Questionable change in initial forces of Septal leads  Confirmed by Lexi Andujar ([id number]) on 2023 7:25:42 AM  (23 @ 20:39)      ACC: [id number] EXAM:  ECHO TTE WITH CON COMP W DOPP                          PROCEDURE DATE:  2023          INTERPRETATION:  TRANSTHORACIC ECHOCARDIOGRAM REPORT        Patient Name:   MELBA PARMAR Patient Location: Inpatient  Medical Rec #:[id number]       Accession #:      [id number]  Account #:                     Height:           68.1 in 173.0 cm  YOB: 1954      Weight:           123.5 lb 56.00 kg  Patient Age:    69 years       BSA:              1.67 m²  Patient Gender: M              BP:               134/98 mmHg      Date of Exam:        2023 10:03:12 AM  Sonographer:         Simin Herron  Referring Physician: Matias Edmondson    Procedure:     2D Echo/Doppler/Color Doppler Complete.  Indications:   I48.91 - Unspecified atrial fibrillation  Diagnosis:     I48.91 - Unspecified atrial fibrillation  Study Details: Technically difficult study. Study quality was adversely   affected                 due to body habitus and the patient being uncooperative.        2D AND M-MODE MEASUREMENTS (normal ranges within parentheses):  Left                 Normal   Aorta/Left            Normal  Ventricle:                    Atrium:  IVSd (2D):    1.21  (0.7-1.1) Aortic Root    3.40  (2.4-3.7)                 cm     (2D):           cm  LVPWd (2D):   1.23  (0.7-1.1) Left Atrium    3.49  (1.9-4.0)                 cm             (2D):           cm  LVIDd (2D):   3.59  (3.4-5.7) LA Volume      25.7                 cm             Index         ml/m²  LVIDs (2D):  2.45                 cm  LV FS (2D):   31.8   (>25%)                  %  LV EF (2D):   61 %   (>55%)  Relative Wall 0.69   (<0.42)  Thickness    LV SYSTOLIC FUNCTION BY 2D PLANIMETRY (MOD):  EF-A4C View: 63.7 % EF-A2C View: 63.2 % EF-Biplane: 65 %    SPECTRAL DOPPLER ANALYSIS (where applicable):  Aortic Valve: AoV Max Bhaskar: 0.94 m/s AoV Peak PG: 3.6 mmHg AoV Mean P.8 mmHg    LVOT Vmax: 0.67 m/s LVOT VTI: 0.113 m LVOT Diameter: 2.37 cm    AoV Area, Vmax: 3.12 cm² AoV Area, VTI: 4.31 cm² AoVArea, Vmn: 3.19 cm²  Ao VTI: 0.116    PHYSICIAN INTERPRETATION:  Left Ventricle: The left ventricular internal cavity size is normal. Left   ventricular wall thickness is normal.  Global LV systolic function was normal. Left ventricular ejection   fraction, by visual estimation, is 60 to 65%. The mitral in-flow pattern   reveals no discernable A-wave, therefore no comment on diastolic function   can be made.  Right Ventricle: Normal right ventricular size and function.  Left Atrium: Normal left atrial size.  Right Atrium: Normal right atrial size.  Pericardium: There is no evidence of pericardial effusion.  Mitral Valve: There is mild mitral annular calcification. Trace mitral   valve regurgitation is seen.  Tricuspid Valve: The tricuspid valve is normal in structure. Adequate TR   velocity was not obtained to accurately assess RVSP.  Aortic Valve: The aortic valve is trileaflet. Sclerotic aortic valve with   normal opening. No evidence of aortic valve regurgitation is seen.  Pulmonic Valve: The pulmonic valve was not well visualized.  Aorta: The aortic root is normal in size and structure.  Pulmonary Artery: The pulmonary artery is not well seen.  Venous: The inferior vena cava is not well visualized.      Summary:   1. Left ventricular ejection fraction, by visual estimation, is 60 to   65%.   2. Normal global left ventricular systolic function.   3. The mitral in-flow pattern reveals no discernable A-wave, therefore   no comment on diastolic function can be made.   4. Normal left atrial size.   5. Normal right atrial size.   6. Mild mitral annular calcification.   7. Trace mitral valve regurgitation.   8. Sclerotic aortic valve with normal opening.    MD Scot Electronically signed on 2023 at 11:46:08 AM  *** Final ***   Endometriosis

## 2023-10-14 NOTE — ED ADULT NURSE NOTE - PSH
17 S/P bunionectomy  right 2012  Status post surgery  endometriosis surgery: 10/2014, 05/2015, 10/2015   appendectomy during the first surgery 10/2014

## 2023-10-30 NOTE — H&P ADULT - DOES THIS PATIENT HAVE A HISTORY OF OR HAS BEEN DX WITH HEART FAILURE?
Take the following medications the morning of surgery:  NONE      Arrive to hospital on your day of surgery at 8:30 AM.      If you are on prescription narcotic pain medication to control your pain you may also take that medication the morning of surgery.    General Instructions:  Do not eat solid food after midnight the night before surgery.  You may drink clear liquids day of surgery but must stop at least one hour before your hospital arrival time.  It is beneficial for you to have a clear drink that contains carbohydrates the day of surgery.  We suggest a 12 to 20 ounce bottle of Gatorade or Powerade for non-diabetic patients or a 12 to 20 ounce bottle of G2 or Powerade Zero for diabetic patients. (Pediatric patients, are not advised to drink a 12 to 20 ounce carbohydrate drink)    Clear liquids are liquids you can see through.  Nothing red in color.     Plain water                               Sports drinks  Sodas                                   Gelatin (Jell-O)  Fruit juices without pulp such as white grape juice and apple juice  Popsicles that contain no fruit or yogurt  Tea or coffee (no cream or milk added)  Gatorade / Powerade  G2 / Powerade Zero    Infants may have breast milk up to four hours before surgery.  Infants drinking formula may drink formula up to six hours before surgery.   Patients who avoid smoking, chewing tobacco and alcohol for 4 weeks prior to surgery have a reduced risk of post-operative complications.  Quit smoking as many days before surgery as you can.  Do not smoke, use chewing tobacco or drink alcohol the day of surgery.   If applicable bring your C-PAP/ BI-PAP machine in with you to preop day of surgery.  Bring any papers given to you in the doctor’s office.  Wear clean comfortable clothes.  Do not wear contact lenses, false eyelashes or make-up.  Bring a case for your glasses.   Bring crutches or walker if applicable.  Remove all piercings.  Leave jewelry and any other  valuables at home.  Hair extensions with metal clips must be removed prior to surgery.  The Pre-Admission Testing nurse will instruct you to bring medications if unable to obtain an accurate list in Pre-Admission Testing.        If you were given a blood bank ID arm band remember to bring it with you the day of surgery.    Preventing a Surgical Site Infection:  For 2 to 3 days before surgery, avoid shaving with a razor because the razor can irritate skin and make it easier to develop an infection.    Any areas of open skin can increase the risk of a post-operative wound infection by allowing bacteria to enter and travel throughout the body.  Notify your surgeon if you have any skin wounds / rashes even if it is not near the expected surgical site.  The area will need assessed to determine if surgery should be delayed until it is healed.  The night prior to surgery shower using a fresh bar of anti-bacterial soap (such as Dial) and clean washcloth.  Sleep in a clean bed with clean clothing.  Do not allow pets to sleep with you.  Shower on the morning of surgery using a fresh bar of anti-bacterial soap (such as Dial) and clean washcloth.  Dry with a clean towel and dress in clean clothing.  Ask your surgeon if you will be receiving antibiotics prior to surgery.  Make sure you, your family, and all healthcare providers clean their hands with soap and water or an alcohol based hand  before caring for you or your wound.    Day of surgery:  Your arrival time is approximately two hours before your scheduled surgery time.  Upon arrival, a Pre-op nurse and Anesthesiologist will review your health history, obtain vital signs, and answer questions you may have.  The only belongings needed at this time will be a list of your home medications and if applicable your C-PAP/BI-PAP machine.  A Pre-op nurse will start an IV and you may receive medication in preparation for surgery, including something to help you relax.      Please be aware that surgery does come with discomfort.  We want to make every effort to control your discomfort so please discuss any uncontrolled symptoms with your nurse.   Your doctor will most likely have prescribed pain medications.      If you are going home after surgery you will receive individualized written care instructions before being discharged.  A responsible adult must drive you to and from the hospital on the day of your surgery and stay with you for 24 hours.  Discharge prescriptions can be filled by the hospital pharmacy during regular pharmacy hours.  If you are having surgery late in the day/evening your prescription may be e-prescribed to your pharmacy.  Please verify your pharmacy hours or chose a 24 hour pharmacy to avoid not having access to your prescription because your pharmacy has closed for the day.    If you are staying overnight following surgery, you will be transported to your hospital room following the recovery period.  [facility] has all private rooms.    If you have any questions please call Pre-Admission Testing at [phone].  Deductibles and co-payments are collected on the day of service. Please be prepared to pay the required co-pay, deductible or deposit on the day of service as defined by your plan.    Call your surgeon immediately if you experience any of the following symptoms:  Sore Throat  Shortness of Breath or difficulty breathing  Cough  Chills  Body soreness or muscle pain  Headache  Fever  New loss of taste or smell  Do not arrive for your surgery ill.  Your procedure will need to be rescheduled to another time.  You will need to call your physician before the day of surgery to avoid any unnecessary exposure to hospital staff as well as other patients.     no

## 2024-03-09 NOTE — ED PROVIDER NOTE - ALLERGIC/IMMUNOLOGIC NEGATIVE STATEMENT, MLM
no dermatitis, no environmental allergies, no food allergies, no immunosuppressive disorder, and no pruritus. DISCHARGE

## 2024-03-12 NOTE — ED ADULT NURSE NOTE - CAS TRG GEN SKIN CONDITION
Glasses are relatively new   Update RX next visit  
Patietn had unexplained weight loss and fatigue as well hilar lymphadenopathy on CT chest 2021 ; prompting a 6/2021 transbronchial lung biopsy that ultimately led to the diagnosis of sarcoidosis.     Follows with Dr. Radha Hooks in [place]  Was formerly on MTX and prednisone  Now not on meds    Continue to follow with Rheum  
Vision changes noted in 12/2020  Noted to have severe optic disc edema   She obtained an MRI Brain and MRV in 12/2020 that were normal  Lumbar puncture opening pressure was 21 cm H20 with otherwise normal CSF studies  (H - [place])    Eventually diagnosed with sarcoidosis as below --> was on methotrexate and steroids for approx 2 years    Optic disc edema resolved with treatment of systemic sarcoidosis    3/12/24: establishing eye care at [facility]  Last took methotrexate in summer 2023  No vision symptoms today  Normal afferent function, HVF full and no optic disc edema    Plan:   Continue to follow with Rheumatology  RTC 6 months for HVF, OCT RNFL  RTC sooner if new onset worsening vision  
Warm

## 2024-04-15 NOTE — ED PROVIDER NOTE - ENMT, MLM
----- Message from Adithya Rodriguez sent at 4/15/2024 10:57 AM CDT -----  Mom need to reschedule pt procedure scheduled on 4/17 due to family emergency          Mom [phone]        Thank you  Scheduling  
Returned mothers call. No answer, left detailed message with callback number to relay that July schedule is not open yet, but I will call to reschedule ablation procedure as soon as we have the July schedule.   
Airway patent, Nasal mucosa clear. Mouth with normal mucosa. Throat has no vesicles, no oropharyngeal exudates and uvula is midline.

## 2025-04-23 NOTE — H&P ADULT - NSHPPHYSICALEXAM_GEN_ALL_CORE
PA/NP...
Vital Signs Last 24 Hrs  T(C): 36.7, Max: 36.8 (05-16 @ 23:11)  T(F): 98.1, Max: 98.2 (05-16 @ 23:11)  HR: 96 (96 - 134)  BP: 100/58 (100/58 - 117/76)  BP(mean): --  RR: 20 (20 - 22)  SpO2: 97% (97% - 98%)  GA: pt visibly shaking  CV: tachycardia, no MRG  Pulm: CTAB  Abd: +BS, diffusely tender on palpation, pt is flexing her abdominal muscles thus feels rigid and appears to be guarding